# Patient Record
Sex: MALE | Race: WHITE | NOT HISPANIC OR LATINO | Employment: UNEMPLOYED | URBAN - METROPOLITAN AREA
[De-identification: names, ages, dates, MRNs, and addresses within clinical notes are randomized per-mention and may not be internally consistent; named-entity substitution may affect disease eponyms.]

---

## 2022-01-31 ENCOUNTER — TELEPHONE (OUTPATIENT)
Dept: HEMATOLOGY/ONCOLOGY | Facility: CLINIC | Age: 82
End: 2022-01-31
Payer: MEDICARE

## 2022-01-31 NOTE — TELEPHONE ENCOUNTER
"Received text on my personal phone from Dr. YUNIER Pete asking to arrange for pt to see Dr. Bales in the next couple of weeks for diagnosis of possible colon cancer. Dr. Pete states that this is his nurse's father in law and that he just had a scope performed this am and it "looks" like cancer. He also stated that the patient does not want surgery.  Dr. Pete faxed lab report and stated he will fax pathology report once it is ready. He asked me to contact Belkys Tipton at [phone] to set up appointment with DR. Bales in 2-3 weeks.   I have spoken to Belkys as requested and together we set up an appt with dr Bales for 2/22/22 at the [facility] for 1120am. She stated pt insurance is medicare. He will bring insurance card with him to appt.   Travel directions and address reviewed with her and she stated understanding. She was told that one person could be in exam room with pt and that the other person could listen in on speaker phone if they so chose. I will follow up on pathology report from 1/31/22 scope and scan to media once received. Other records scanned to media today.   She has my direct phone number and will call with any further concerns meanwhile.   "

## 2022-02-17 ENCOUNTER — TELEPHONE (OUTPATIENT)
Dept: HEMATOLOGY/ONCOLOGY | Facility: CLINIC | Age: 82
End: 2022-02-17
Payer: MEDICARE

## 2022-02-18 ENCOUNTER — TELEPHONE (OUTPATIENT)
Dept: HEMATOLOGY/ONCOLOGY | Facility: CLINIC | Age: 82
End: 2022-02-18
Payer: MEDICARE

## 2022-02-18 NOTE — TELEPHONE ENCOUNTER
Lauren (pt family member) called in today to verify appt details. We reviewed 2/22/22 appt details and travel instructions. She will have pt attend.

## 2022-02-22 ENCOUNTER — LAB VISIT (OUTPATIENT)
Dept: LAB | Facility: HOSPITAL | Age: 82
End: 2022-02-22
Attending: INTERNAL MEDICINE
Payer: MEDICARE

## 2022-02-22 ENCOUNTER — PATIENT MESSAGE (OUTPATIENT)
Dept: HEMATOLOGY/ONCOLOGY | Facility: CLINIC | Age: 82
End: 2022-02-22

## 2022-02-22 ENCOUNTER — OFFICE VISIT (OUTPATIENT)
Dept: HEMATOLOGY/ONCOLOGY | Facility: CLINIC | Age: 82
End: 2022-02-22
Payer: MEDICARE

## 2022-02-22 VITALS
HEIGHT: 78 IN | SYSTOLIC BLOOD PRESSURE: 131 MMHG | OXYGEN SATURATION: 97 % | HEART RATE: 111 BPM | TEMPERATURE: 98 F | DIASTOLIC BLOOD PRESSURE: 75 MMHG

## 2022-02-22 DIAGNOSIS — D50.0 IRON DEFICIENCY ANEMIA DUE TO CHRONIC BLOOD LOSS: ICD-10-CM

## 2022-02-22 DIAGNOSIS — R94.5 ABNORMAL RESULTS OF LIVER FUNCTION STUDIES: ICD-10-CM

## 2022-02-22 DIAGNOSIS — R93.7 ABNORMAL FINDINGS ON DIAGNOSTIC IMAGING OF OTHER PARTS OF MUSCULOSKELETAL SYSTEM: ICD-10-CM

## 2022-02-22 DIAGNOSIS — C18.2 MALIGNANT NEOPLASM OF ASCENDING COLON: Primary | ICD-10-CM

## 2022-02-22 DIAGNOSIS — C18.2 MALIGNANT NEOPLASM OF ASCENDING COLON: ICD-10-CM

## 2022-02-22 DIAGNOSIS — R94.6 ABNORMAL RESULTS OF THYROID FUNCTION STUDIES: ICD-10-CM

## 2022-02-22 DIAGNOSIS — D51.0 VITAMIN B12 DEFICIENCY ANEMIA DUE TO INTRINSIC FACTOR DEFICIENCY: ICD-10-CM

## 2022-02-22 LAB
ALBUMIN SERPL BCP-MCNC: 2.9 G/DL (ref 3.5–5.2)
ALP SERPL-CCNC: 76 U/L (ref 55–135)
ALT SERPL W/O P-5'-P-CCNC: 9 U/L (ref 10–44)
ANION GAP SERPL CALC-SCNC: 12 MMOL/L (ref 8–16)
AST SERPL-CCNC: 15 U/L (ref 10–40)
BASOPHILS # BLD AUTO: 0.09 K/UL (ref 0–0.2)
BASOPHILS NFR BLD: 0.8 % (ref 0–1.9)
BILIRUB SERPL-MCNC: 0.4 MG/DL (ref 0.1–1)
BUN SERPL-MCNC: 16 MG/DL (ref 8–23)
CALCIUM SERPL-MCNC: 8.9 MG/DL (ref 8.7–10.5)
CEA SERPL-MCNC: 6.8 NG/ML (ref 0–5)
CHLORIDE SERPL-SCNC: 100 MMOL/L (ref 95–110)
CO2 SERPL-SCNC: 25 MMOL/L (ref 23–29)
COMPLEXED PSA SERPL-MCNC: 6.6 NG/ML (ref 0–4)
CREAT SERPL-MCNC: 0.6 MG/DL (ref 0.5–1.4)
DIFFERENTIAL METHOD: ABNORMAL
EOSINOPHIL # BLD AUTO: 0.2 K/UL (ref 0–0.5)
EOSINOPHIL NFR BLD: 1.3 % (ref 0–8)
ERYTHROCYTE [DISTWIDTH] IN BLOOD BY AUTOMATED COUNT: 14.8 % (ref 11.5–14.5)
EST. GFR  (AFRICAN AMERICAN): >60 ML/MIN/1.73 M^2
EST. GFR  (NON AFRICAN AMERICAN): >60 ML/MIN/1.73 M^2
FERRITIN SERPL-MCNC: 334 NG/ML (ref 20–300)
GLUCOSE SERPL-MCNC: 101 MG/DL (ref 70–110)
HCT VFR BLD AUTO: 38.1 % (ref 40–54)
HGB BLD-MCNC: 11.9 G/DL (ref 14–18)
IMM GRANULOCYTES # BLD AUTO: 0.1 K/UL (ref 0–0.04)
IMM GRANULOCYTES NFR BLD AUTO: 0.9 % (ref 0–0.5)
IRON SERPL-MCNC: 17 UG/DL (ref 45–160)
LDH SERPL L TO P-CCNC: 166 U/L (ref 110–260)
LYMPHOCYTES # BLD AUTO: 1 K/UL (ref 1–4.8)
LYMPHOCYTES NFR BLD: 8.5 % (ref 18–48)
MCH RBC QN AUTO: 28.2 PG (ref 27–31)
MCHC RBC AUTO-ENTMCNC: 31.2 G/DL (ref 32–36)
MCV RBC AUTO: 90 FL (ref 82–98)
MONOCYTES # BLD AUTO: 1.1 K/UL (ref 0.3–1)
MONOCYTES NFR BLD: 9 % (ref 4–15)
NEUTROPHILS # BLD AUTO: 9.3 K/UL (ref 1.8–7.7)
NEUTROPHILS NFR BLD: 79.5 % (ref 38–73)
NRBC BLD-RTO: 0 /100 WBC
PLATELET # BLD AUTO: 489 K/UL (ref 150–450)
PMV BLD AUTO: 10.9 FL (ref 9.2–12.9)
POTASSIUM SERPL-SCNC: 4 MMOL/L (ref 3.5–5.1)
PROT SERPL-MCNC: 6.8 G/DL (ref 6–8.4)
RBC # BLD AUTO: 4.22 M/UL (ref 4.6–6.2)
SATURATED IRON: 8 % (ref 20–50)
SODIUM SERPL-SCNC: 137 MMOL/L (ref 136–145)
TOTAL IRON BINDING CAPACITY: 203 UG/DL (ref 250–450)
TRANSFERRIN SERPL-MCNC: 137 MG/DL (ref 200–375)
TSH SERPL DL<=0.005 MIU/L-ACNC: 2.22 UIU/ML (ref 0.4–4)
VIT B12 SERPL-MCNC: 992 PG/ML (ref 210–950)
WBC # BLD AUTO: 11.67 K/UL (ref 3.9–12.7)

## 2022-02-22 PROCEDURE — 84153 ASSAY OF PSA TOTAL: CPT | Performed by: INTERNAL MEDICINE

## 2022-02-22 PROCEDURE — 36415 COLL VENOUS BLD VENIPUNCTURE: CPT | Performed by: INTERNAL MEDICINE

## 2022-02-22 PROCEDURE — 99999 PR PBB SHADOW E&M-EST. PATIENT-LVL IV: ICD-10-PCS | Mod: PBBFAC,,, | Performed by: INTERNAL MEDICINE

## 2022-02-22 PROCEDURE — 99205 PR OFFICE/OUTPT VISIT, NEW, LEVL V, 60-74 MIN: ICD-10-PCS | Mod: S$PBB,,, | Performed by: INTERNAL MEDICINE

## 2022-02-22 PROCEDURE — 85025 COMPLETE CBC W/AUTO DIFF WBC: CPT | Performed by: INTERNAL MEDICINE

## 2022-02-22 PROCEDURE — 83615 LACTATE (LD) (LDH) ENZYME: CPT | Performed by: INTERNAL MEDICINE

## 2022-02-22 PROCEDURE — 80053 COMPREHEN METABOLIC PANEL: CPT | Performed by: INTERNAL MEDICINE

## 2022-02-22 PROCEDURE — 87389 HIV-1 AG W/HIV-1&-2 AB AG IA: CPT | Performed by: INTERNAL MEDICINE

## 2022-02-22 PROCEDURE — 80074 ACUTE HEPATITIS PANEL: CPT | Performed by: INTERNAL MEDICINE

## 2022-02-22 PROCEDURE — 99205 OFFICE O/P NEW HI 60 MIN: CPT | Mod: S$PBB,,, | Performed by: INTERNAL MEDICINE

## 2022-02-22 PROCEDURE — 99999 PR PBB SHADOW E&M-EST. PATIENT-LVL IV: CPT | Mod: PBBFAC,,, | Performed by: INTERNAL MEDICINE

## 2022-02-22 PROCEDURE — 82378 CARCINOEMBRYONIC ANTIGEN: CPT | Performed by: INTERNAL MEDICINE

## 2022-02-22 PROCEDURE — 82607 VITAMIN B-12: CPT | Performed by: INTERNAL MEDICINE

## 2022-02-22 PROCEDURE — 82728 ASSAY OF FERRITIN: CPT | Performed by: INTERNAL MEDICINE

## 2022-02-22 PROCEDURE — 84466 ASSAY OF TRANSFERRIN: CPT | Performed by: INTERNAL MEDICINE

## 2022-02-22 PROCEDURE — 84443 ASSAY THYROID STIM HORMONE: CPT | Performed by: INTERNAL MEDICINE

## 2022-02-22 PROCEDURE — 99214 OFFICE O/P EST MOD 30 MIN: CPT | Mod: PBBFAC | Performed by: INTERNAL MEDICINE

## 2022-02-22 RX ORDER — OXYCODONE AND ACETAMINOPHEN 10; 325 MG/1; MG/1
10 TABLET ORAL
COMMUNITY

## 2022-02-22 NOTE — PROGRESS NOTES
Subjective:       Patient ID: Jag Tipton is a 81 y.o. male.    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  Results in media section patient has had CT abdomen pelvis approximately 1 month ago which reveals no evidence gross metastatic disease.  Patient is presents with family with increasing fatigue weakness abdominal discomfort.  For possible consideration potential primary surgical resection ECOG status 3 patient's surgeon contacted me Dr. Sorin Pete in spoke with me concerning the patient    Past Medical History:   Diagnosis Date    Colon cancer 02/2022    cecal     Family History   Problem Relation Age of Onset    Cancer Sister     Cancer Brother      Social History     Socioeconomic History    Marital status: Legally    Tobacco Use    Smoking status: Current Every Day Smoker     Packs/day: 1.00     Years: 65.00     Pack years: 65.00     Types: Cigarettes    Smokeless tobacco: Never Used   Substance and Sexual Activity    Alcohol use: Not Currently    Drug use: Never    Sexual activity: Not Currently     Partners: Female     History reviewed. No pertinent surgical history.    Labs:  No results found for: WBC, HGB, HCT, MCV, PLT  BMP  No results found for: NA, K, CL, CO2, BUN, CREATININE, CALCIUM, ANIONGAP, ESTGFRAFRICA, EGFRNONAA  No results found for: ALT, AST, GGT, ALKPHOS, BILITOT    No results found for: IRON, TIBC, FERRITIN, SATURATEDIRO  No results found for: WEIGAEEM94  No results found for: FOLATE  No results found for: TSH      Review of Systems   Constitutional: Positive for activity change, appetite change, fatigue and unexpected weight change. Negative for chills, diaphoresis and fever.   HENT: Negative for congestion, dental problem, drooling, ear discharge, ear pain, facial swelling, hearing loss, mouth sores, nosebleeds, postnasal drip, rhinorrhea, sinus pressure, sneezing, sore throat, tinnitus, trouble  swallowing and voice change.    Eyes: Negative for photophobia, pain, discharge, redness, itching and visual disturbance.   Respiratory: Negative for apnea, cough, choking, chest tightness, shortness of breath, wheezing and stridor.    Cardiovascular: Negative for chest pain, palpitations and leg swelling.   Gastrointestinal: Positive for abdominal pain. Negative for abdominal distention, anal bleeding, blood in stool, constipation, diarrhea, nausea, rectal pain and vomiting.   Endocrine: Negative for cold intolerance, heat intolerance, polydipsia, polyphagia and polyuria.   Genitourinary: Negative for decreased urine volume, difficulty urinating, dysuria, enuresis, flank pain, frequency, genital sores, hematuria, penile discharge, penile pain, penile swelling, scrotal swelling, testicular pain and urgency.   Musculoskeletal: Positive for gait problem. Negative for arthralgias, back pain, joint swelling, myalgias, neck pain and neck stiffness.   Skin: Negative for color change, pallor, rash and wound.   Allergic/Immunologic: Negative for environmental allergies, food allergies and immunocompromised state.   Neurological: Positive for weakness. Negative for dizziness, tremors, seizures, syncope, facial asymmetry, speech difficulty, light-headedness, numbness and headaches.   Hematological: Negative for adenopathy. Does not bruise/bleed easily.   Psychiatric/Behavioral: Positive for dysphoric mood. Negative for agitation, behavioral problems, confusion, decreased concentration, hallucinations, self-injury, sleep disturbance and suicidal ideas. The patient is nervous/anxious. The patient is not hyperactive.        Objective:      Physical Exam  Vitals reviewed.   Constitutional:       General: He is in acute distress.      Appearance: He is well-developed. He is cachectic. He is ill-appearing. He is not diaphoretic.   HENT:      Head: Normocephalic.      Right Ear: External ear normal.      Left Ear: External ear  normal.      Nose: Nose normal.      Right Sinus: No maxillary sinus tenderness or frontal sinus tenderness.      Left Sinus: No maxillary sinus tenderness or frontal sinus tenderness.      Mouth/Throat:      Pharynx: No oropharyngeal exudate.   Eyes:      General: Lids are normal. No scleral icterus.        Right eye: No discharge.         Left eye: No discharge.      Extraocular Movements:      Right eye: Normal extraocular motion.      Left eye: Normal extraocular motion.      Conjunctiva/sclera:      Right eye: Right conjunctiva is not injected. No hemorrhage.     Left eye: Left conjunctiva is not injected. No hemorrhage.     Pupils: Pupils are equal, round, and reactive to light.   Neck:      Thyroid: No thyromegaly.      Vascular: No JVD.      Trachea: No tracheal deviation.   Cardiovascular:      Rate and Rhythm: Normal rate and regular rhythm.      Heart sounds: Normal heart sounds.   Pulmonary:      Effort: Pulmonary effort is normal. No respiratory distress.      Breath sounds: Normal breath sounds. No stridor.   Chest:   Breasts:      Right: No supraclavicular adenopathy.      Left: No supraclavicular adenopathy.       Abdominal:      General: Bowel sounds are normal.      Palpations: Abdomen is soft. There is no hepatomegaly, splenomegaly or mass.      Tenderness: There is no abdominal tenderness.   Musculoskeletal:         General: No tenderness. Normal range of motion.      Cervical back: Normal range of motion and neck supple.   Lymphadenopathy:      Head:      Right side of head: No posterior auricular or occipital adenopathy.      Left side of head: No posterior auricular or occipital adenopathy.      Cervical: No cervical adenopathy.      Right cervical: No superficial, deep or posterior cervical adenopathy.     Left cervical: No superficial, deep or posterior cervical adenopathy.      Upper Body:      Right upper body: No supraclavicular adenopathy.      Left upper body: No supraclavicular  adenopathy.   Skin:     General: Skin is dry.      Findings: No erythema or rash.      Nails: There is no clubbing.   Neurological:      Mental Status: He is alert and oriented to person, place, and time.      Cranial Nerves: No cranial nerve deficit.      Motor: Weakness present.      Coordination: Coordination abnormal.      Gait: Gait abnormal.   Psychiatric:         Behavior: Behavior normal.         Thought Content: Thought content normal.         Judgment: Judgment normal.             Assessment:      1. Malignant neoplasm of ascending colon    2. Iron deficiency anemia due to chronic blood loss    3. Abnormal results of thyroid function studies     4. Vitamin B12 deficiency anemia due to intrinsic factor deficiency     5. Abnormal results of liver function studies            Plan:     Extremely cachectic man history of primary colon resection high likelihood of metastatic disease.  But not seen on CT abdomen pelvis on report will have laboratory studies performed today communicate through portal recommend CT chest abdomen pelvis and see patient back immediately afterwards patient would be scheduled be seen by colorectal surgery no evidence of metastatic disease for possible primary surgical resection assuming patient is medically stable and no evidence of widespread discussed with patient if no further therapy is recommended I believe survival is less than 6 months.  Family present        Gerry Bales Jr, MD FACP

## 2022-02-23 ENCOUNTER — TELEPHONE (OUTPATIENT)
Dept: HEMATOLOGY/ONCOLOGY | Facility: CLINIC | Age: 82
End: 2022-02-23
Payer: MEDICARE

## 2022-02-23 ENCOUNTER — PATIENT MESSAGE (OUTPATIENT)
Dept: HEMATOLOGY/ONCOLOGY | Facility: CLINIC | Age: 82
End: 2022-02-23
Payer: MEDICARE

## 2022-02-23 NOTE — TELEPHONE ENCOUNTER
SW intern phoned pt to discuss their distress score of 5. Pt's daughter in law answered and explained that pt has a good support system and denied needing services at this time. SW intern will continue to monitor pt's distress score and remain available to assist with pt's needs.

## 2022-02-23 NOTE — TELEPHONE ENCOUNTER
Moved your CEA as well as the PSA or relatively low.  Your very iron deficient because you bleeding from the intestines.  If you were to decide to proceed with surgery you would need to have intravenous iron given prior to any surgery to increase her hemoglobin

## 2022-02-24 ENCOUNTER — TELEPHONE (OUTPATIENT)
Dept: SURGERY | Facility: CLINIC | Age: 82
End: 2022-02-24
Payer: MEDICARE

## 2022-02-24 NOTE — TELEPHONE ENCOUNTER
"Spoke to pt's son, Jag Jr Saeed - Advised of pt's scheduled appt with Dr Castro for Monday 3/7 [facility] @ 1500 - Son correctly repeated back all appt information - Son also asked if it's ok to see Dr Castro prior to pt's CT scheduled for 3/22 - Advised son our protocol is to have any Cancer pt's being referred to Dr Castro  scheduled within 2 weeks of referral date - Advised moving pt's appt to after 3/22 is much to far outside of our timeframe for cancer pt's -Advised, Dr Castro may want test performed that pt can get taken care of between 3/7 and 3/22 when his CT is scheduled for - Son verbalized understanding and stated "I will make sure my dad gets there"  "

## 2022-02-28 LAB
HAV IGM SERPL QL IA: NEGATIVE
HBV CORE IGM SERPL QL IA: NEGATIVE
HBV SURFACE AG SERPL QL IA: NEGATIVE
HCV AB SERPL QL IA: NEGATIVE
HIV 1+2 AB+HIV1 P24 AG SERPL QL IA: NEGATIVE

## 2022-03-07 ENCOUNTER — TELEPHONE (OUTPATIENT)
Dept: SURGERY | Facility: CLINIC | Age: 82
End: 2022-03-07
Payer: MEDICARE

## 2022-03-07 ENCOUNTER — OFFICE VISIT (OUTPATIENT)
Dept: SURGERY | Facility: CLINIC | Age: 82
End: 2022-03-07
Payer: MEDICARE

## 2022-03-07 VITALS — BODY MASS INDEX: 16.38 KG/M2 | WEIGHT: 141.75 LBS | TEMPERATURE: 99 F

## 2022-03-07 DIAGNOSIS — C18.2 MALIGNANT NEOPLASM OF ASCENDING COLON: Primary | ICD-10-CM

## 2022-03-07 DIAGNOSIS — Z01.818 PRE-OP TESTING: Primary | ICD-10-CM

## 2022-03-07 PROCEDURE — 99214 OFFICE O/P EST MOD 30 MIN: CPT | Mod: PBBFAC | Performed by: COLON & RECTAL SURGERY

## 2022-03-07 PROCEDURE — 99999 PR PBB SHADOW E&M-EST. PATIENT-LVL IV: CPT | Mod: PBBFAC,,, | Performed by: COLON & RECTAL SURGERY

## 2022-03-07 PROCEDURE — 99205 OFFICE O/P NEW HI 60 MIN: CPT | Mod: GW,S$PBB,, | Performed by: COLON & RECTAL SURGERY

## 2022-03-07 PROCEDURE — 99205 PR OFFICE/OUTPT VISIT, NEW, LEVL V, 60-74 MIN: ICD-10-PCS | Mod: GW,S$PBB,, | Performed by: COLON & RECTAL SURGERY

## 2022-03-07 PROCEDURE — 99999 PR PBB SHADOW E&M-EST. PATIENT-LVL IV: ICD-10-PCS | Mod: PBBFAC,,, | Performed by: COLON & RECTAL SURGERY

## 2022-03-07 RX ORDER — SODIUM CHLORIDE, SODIUM LACTATE, POTASSIUM CHLORIDE, CALCIUM CHLORIDE 600; 310; 30; 20 MG/100ML; MG/100ML; MG/100ML; MG/100ML
INJECTION, SOLUTION INTRAVENOUS CONTINUOUS
Status: CANCELLED | OUTPATIENT
Start: 2022-03-07

## 2022-03-07 RX ORDER — METRONIDAZOLE 500 MG/100ML
500 INJECTION, SOLUTION INTRAVENOUS
Status: CANCELLED | OUTPATIENT
Start: 2022-03-07

## 2022-03-07 RX ORDER — ACETAMINOPHEN 325 MG/1
1000 TABLET ORAL ONCE
Status: CANCELLED | OUTPATIENT
Start: 2022-03-07 | End: 2022-03-07

## 2022-03-07 RX ORDER — NEOMYCIN SULFATE 500 MG/1
1000 TABLET ORAL 3 TIMES DAILY
Qty: 6 TABLET | Refills: 0 | Status: SHIPPED | OUTPATIENT
Start: 2022-03-07

## 2022-03-07 RX ORDER — POLYETHYLENE GLYCOL 3350 17 G/17G
238 POWDER, FOR SOLUTION ORAL DAILY
Qty: 1 EACH | Refills: 0 | Status: SHIPPED | OUTPATIENT
Start: 2022-03-07

## 2022-03-07 RX ORDER — ONDANSETRON 2 MG/ML
4 INJECTION INTRAMUSCULAR; INTRAVENOUS EVERY 12 HOURS PRN
Status: CANCELLED | OUTPATIENT
Start: 2022-03-07

## 2022-03-07 RX ORDER — CELECOXIB 100 MG/1
400 CAPSULE ORAL
Status: CANCELLED | OUTPATIENT
Start: 2022-03-07 | End: 2022-03-07

## 2022-03-07 RX ORDER — HEPARIN SODIUM 5000 [USP'U]/ML
5000 INJECTION, SOLUTION INTRAVENOUS; SUBCUTANEOUS
Status: CANCELLED | OUTPATIENT
Start: 2022-03-07 | End: 2022-03-08

## 2022-03-07 RX ORDER — METRONIDAZOLE 500 MG/1
500 TABLET ORAL 3 TIMES DAILY
Qty: 3 TABLET | Refills: 0 | Status: SHIPPED | OUTPATIENT
Start: 2022-03-07

## 2022-03-07 RX ORDER — GABAPENTIN 100 MG/1
800 CAPSULE ORAL
Status: CANCELLED | OUTPATIENT
Start: 2022-03-07 | End: 2022-03-07

## 2022-03-07 NOTE — TELEPHONE ENCOUNTER
Spoke to Nikki with [facility] - Asked that we call before ordering any testing for pt and or scheduling Sx - To help ensure pt does not get dropped from Hospice - Advised Nikki I would call if any test or Sx are needed [phone]      ----- Message from Harley Toth sent at 3/7/2022 10:00 AM CST -----  Contact: [facility]  Warner Christopher from [facility] Needs to speak with the office to let the PT know he is a hospice patient. Call back at [phone].

## 2022-03-07 NOTE — PROGRESS NOTES
History & Physical    SUBJECTIVE:     CC: Colon cancer  Ref: Castro Bales MD    History of Present Illness:  Patient is a 81 y.o. 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  Patient has developed abdominal pain and a 40 lb weight loss over the past 6 months prompting a workup.  CT scan of the abdomen and pelvis revealed a large cecal mass and evidence of metastatic disease.  Patient did undergo colonoscopy where a large mass was seen in the right colon confirming adenocarcinoma.  Patient has had associated decreased appetite.  Denies any hematochezia or melena.  Does suffer from intermittent fecal incontinence.  Has seen Hematology-Oncology and referred to Colorectal surgery for primary resection.  He denies any family history of colorectal cancer.  Only past surgical history is right inguinal hernia repair    Review of patient's allergies indicates:  No Known Allergies    Current Outpatient Medications   Medication Sig Dispense Refill    oxyCODONE-acetaminophen (PERCOCET)  mg per tablet Take 10 tablets by mouth as needed.      fentaNYL (DURAGESIC) 100 mcg/hr Place 1 patch onto the skin every 72 hours.      furosemide (LASIX) 20 MG tablet Take 20 mg by mouth 2 (two) times daily.      ibuprofen (ADVIL,MOTRIN) 600 MG tablet Take 600 mg by mouth every 6 (six) hours as needed for Pain.      metroNIDAZOLE (FLAGYL) 500 MG tablet Take 1 tablet (500 mg total) by mouth 3 (three) times daily. Take initial dose@2pm, second dose@3pm and final dose@10pm day before surgery 3 tablet 0    neomycin (MYCIFRADIN) 500 mg Tab Take 2 tablets (1,000 mg total) by mouth 3 (three) times daily. Take 1st dose@2pm,take 2nd dose@3pm, take last dose@10pm day before surgery 6 tablet 0    polyethylene glycol (GLYCOLAX) 17 gram/dose powder Take 238 g by mouth once daily. Mix with 2L of gatorade, drink all mixed solution starting@12pm day before surgery, finish by 10pm 1 each 0    traZODone (DESYREL) 50 MG tablet Take 50 mg by  mouth every evening.       No current facility-administered medications for this visit.       Past Medical History:   Diagnosis Date    Colon cancer 02/2022    cecal     No past surgical history on file.  Family History   Problem Relation Age of Onset    Cancer Sister     Cancer Brother      Social History     Tobacco Use    Smoking status: Current Every Day Smoker     Packs/day: 1.00     Years: 65.00     Pack years: 65.00     Types: Cigarettes    Smokeless tobacco: Never Used   Substance Use Topics    Alcohol use: Not Currently    Drug use: Never        Review of Systems:  Review of Systems   Constitutional: Positive for activity change, appetite change and unexpected weight change. Negative for chills, fatigue and fever.   HENT: Negative for congestion, ear pain, sore throat and trouble swallowing.    Eyes: Negative for pain, redness and itching.   Respiratory: Negative for cough, shortness of breath and wheezing.    Cardiovascular: Negative for chest pain, palpitations and leg swelling.   Gastrointestinal: Positive for abdominal pain. Negative for abdominal distention, anal bleeding, blood in stool, constipation, diarrhea, nausea, rectal pain and vomiting.   Endocrine: Negative for cold intolerance, heat intolerance and polyuria.   Genitourinary: Negative for dysuria, flank pain, frequency and hematuria.   Musculoskeletal: Negative for gait problem, joint swelling and neck pain.   Skin: Negative for color change, rash and wound.   Allergic/Immunologic: Negative for environmental allergies and immunocompromised state.   Neurological: Negative for dizziness, speech difficulty, weakness and numbness.   Psychiatric/Behavioral: Negative for agitation, confusion and hallucinations.       OBJECTIVE:     Vital Signs (Most Recent)  Temp: 98.6 °F (37 °C) (03/07/22 1425)     64.3 kg (141 lb 12.1 oz)     Physical Exam:  Physical Exam  Constitutional:       Appearance: He is well-developed.      Comments: Cachectic    HENT:      Head: Normocephalic and atraumatic.   Eyes:      Conjunctiva/sclera: Conjunctivae normal.   Neck:      Thyroid: No thyromegaly.   Cardiovascular:      Rate and Rhythm: Normal rate and regular rhythm.   Pulmonary:      Effort: Pulmonary effort is normal. No respiratory distress.   Abdominal:      General: There is no distension.      Palpations: Abdomen is soft. There is mass (RLQ).      Tenderness: There is no abdominal tenderness.   Musculoskeletal:         General: No tenderness. Normal range of motion.      Cervical back: Normal range of motion.   Skin:     General: Skin is warm and dry.      Capillary Refill: Capillary refill takes less than 2 seconds.      Findings: No rash.   Neurological:      Mental Status: He is alert and oriented to person, place, and time.         Laboratory  Lab Results   Component Value Date    WBC 11.67 02/22/2022    HGB 11.9 (L) 02/22/2022    HCT 38.1 (L) 02/22/2022     (H) 02/22/2022    ALT 9 (L) 02/22/2022    AST 15 02/22/2022     02/22/2022    K 4.0 02/22/2022     02/22/2022    CREATININE 0.6 02/22/2022    BUN 16 02/22/2022    CO2 25 02/22/2022    TSH 2.216 02/22/2022     CEA: 6.8 (Feb '22)      Diagnostic Results:  CT: Reviewed    Colonoscopy images:      PATH: reviewed OSH reports    ASSESSMENT/PLAN:     80yo M with R colon adenocarcinoma with severe weight loss and fecal incontinence    - long discussion with the patient and his family regarding his diagnosis of adenocarcinoma.  Discussed that with the lack of metastatic disease, the severe symptoms he is experiencing, I would recommend upfront resection for him.  We discussed that with ongoing fecal incontinence, severe weight loss and malnutrition, end ileostomy should be considered and he is comfortable with this and would like to proceed with an ileostomy.  - plan for open right hemicolectomy with end ileostomy on 03/15/2022  - All risks, benefits and alternatives fully explained to patient.  Risks include, but are not limited to, bleeding, infection, anastomotic leak, damage to ureter, damage to other intra-abdominal organs such as colon, rectum, small bowel, stomach, liver, bladder, reproductive organs, sexual dysfunction, urinary dysfunction, postoperative abscess, perioperative MI, CVA and death.  All questions field and appropriately answered to patient's satisfaction.  Consent signed and placed on chart.  - mechanical and oral antibiotic bowel prep  - ERAS protocol  - WCON c/s and marking preop day of surgery  - RTC postop    Michael Castro MD  Colon and Rectal Surgery  [facility]

## 2022-03-07 NOTE — H&P (VIEW-ONLY)
History & Physical    SUBJECTIVE:     CC: Colon cancer  Ref: Castro Bales MD    History of Present Illness:  Patient is a 81 y.o. 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  Patient has developed abdominal pain and a 40 lb weight loss over the past 6 months prompting a workup.  CT scan of the abdomen and pelvis revealed a large cecal mass and evidence of metastatic disease.  Patient did undergo colonoscopy where a large mass was seen in the right colon confirming adenocarcinoma.  Patient has had associated decreased appetite.  Denies any hematochezia or melena.  Does suffer from intermittent fecal incontinence.  Has seen Hematology-Oncology and referred to Colorectal surgery for primary resection.  He denies any family history of colorectal cancer.  Only past surgical history is right inguinal hernia repair    Review of patient's allergies indicates:  No Known Allergies    Current Outpatient Medications   Medication Sig Dispense Refill    oxyCODONE-acetaminophen (PERCOCET)  mg per tablet Take 10 tablets by mouth as needed.      fentaNYL (DURAGESIC) 100 mcg/hr Place 1 patch onto the skin every 72 hours.      furosemide (LASIX) 20 MG tablet Take 20 mg by mouth 2 (two) times daily.      ibuprofen (ADVIL,MOTRIN) 600 MG tablet Take 600 mg by mouth every 6 (six) hours as needed for Pain.      metroNIDAZOLE (FLAGYL) 500 MG tablet Take 1 tablet (500 mg total) by mouth 3 (three) times daily. Take initial dose@2pm, second dose@3pm and final dose@10pm day before surgery 3 tablet 0    neomycin (MYCIFRADIN) 500 mg Tab Take 2 tablets (1,000 mg total) by mouth 3 (three) times daily. Take 1st dose@2pm,take 2nd dose@3pm, take last dose@10pm day before surgery 6 tablet 0    polyethylene glycol (GLYCOLAX) 17 gram/dose powder Take 238 g by mouth once daily. Mix with 2L of gatorade, drink all mixed solution starting@12pm day before surgery, finish by 10pm 1 each 0    traZODone (DESYREL) 50 MG tablet Take 50 mg by  mouth every evening.       No current facility-administered medications for this visit.       Past Medical History:   Diagnosis Date    Colon cancer 02/2022    cecal     No past surgical history on file.  Family History   Problem Relation Age of Onset    Cancer Sister     Cancer Brother      Social History     Tobacco Use    Smoking status: Current Every Day Smoker     Packs/day: 1.00     Years: 65.00     Pack years: 65.00     Types: Cigarettes    Smokeless tobacco: Never Used   Substance Use Topics    Alcohol use: Not Currently    Drug use: Never        Review of Systems:  Review of Systems   Constitutional: Positive for activity change, appetite change and unexpected weight change. Negative for chills, fatigue and fever.   HENT: Negative for congestion, ear pain, sore throat and trouble swallowing.    Eyes: Negative for pain, redness and itching.   Respiratory: Negative for cough, shortness of breath and wheezing.    Cardiovascular: Negative for chest pain, palpitations and leg swelling.   Gastrointestinal: Positive for abdominal pain. Negative for abdominal distention, anal bleeding, blood in stool, constipation, diarrhea, nausea, rectal pain and vomiting.   Endocrine: Negative for cold intolerance, heat intolerance and polyuria.   Genitourinary: Negative for dysuria, flank pain, frequency and hematuria.   Musculoskeletal: Negative for gait problem, joint swelling and neck pain.   Skin: Negative for color change, rash and wound.   Allergic/Immunologic: Negative for environmental allergies and immunocompromised state.   Neurological: Negative for dizziness, speech difficulty, weakness and numbness.   Psychiatric/Behavioral: Negative for agitation, confusion and hallucinations.       OBJECTIVE:     Vital Signs (Most Recent)  Temp: 98.6 °F (37 °C) (03/07/22 1425)     64.3 kg (141 lb 12.1 oz)     Physical Exam:  Physical Exam  Constitutional:       Appearance: He is well-developed.      Comments: Cachectic    HENT:      Head: Normocephalic and atraumatic.   Eyes:      Conjunctiva/sclera: Conjunctivae normal.   Neck:      Thyroid: No thyromegaly.   Cardiovascular:      Rate and Rhythm: Normal rate and regular rhythm.   Pulmonary:      Effort: Pulmonary effort is normal. No respiratory distress.   Abdominal:      General: There is no distension.      Palpations: Abdomen is soft. There is mass (RLQ).      Tenderness: There is no abdominal tenderness.   Musculoskeletal:         General: No tenderness. Normal range of motion.      Cervical back: Normal range of motion.   Skin:     General: Skin is warm and dry.      Capillary Refill: Capillary refill takes less than 2 seconds.      Findings: No rash.   Neurological:      Mental Status: He is alert and oriented to person, place, and time.         Laboratory  Lab Results   Component Value Date    WBC 11.67 02/22/2022    HGB 11.9 (L) 02/22/2022    HCT 38.1 (L) 02/22/2022     (H) 02/22/2022    ALT 9 (L) 02/22/2022    AST 15 02/22/2022     02/22/2022    K 4.0 02/22/2022     02/22/2022    CREATININE 0.6 02/22/2022    BUN 16 02/22/2022    CO2 25 02/22/2022    TSH 2.216 02/22/2022     CEA: 6.8 (Feb '22)      Diagnostic Results:  CT: Reviewed    Colonoscopy images:      PATH: reviewed OSH reports    ASSESSMENT/PLAN:     82yo M with R colon adenocarcinoma with severe weight loss and fecal incontinence    - long discussion with the patient and his family regarding his diagnosis of adenocarcinoma.  Discussed that with the lack of metastatic disease, the severe symptoms he is experiencing, I would recommend upfront resection for him.  We discussed that with ongoing fecal incontinence, severe weight loss and malnutrition, end ileostomy should be considered and he is comfortable with this and would like to proceed with an ileostomy.  - plan for open right hemicolectomy with end ileostomy on 03/15/2022  - All risks, benefits and alternatives fully explained to patient.  Risks include, but are not limited to, bleeding, infection, anastomotic leak, damage to ureter, damage to other intra-abdominal organs such as colon, rectum, small bowel, stomach, liver, bladder, reproductive organs, sexual dysfunction, urinary dysfunction, postoperative abscess, perioperative MI, CVA and death.  All questions field and appropriately answered to patient's satisfaction.  Consent signed and placed on chart.  - mechanical and oral antibiotic bowel prep  - ERAS protocol  - WCON c/s and marking preop day of surgery  - RTC postop    Michael Castro MD  Colon and Rectal Surgery  [facility]

## 2022-03-10 ENCOUNTER — TELEPHONE (OUTPATIENT)
Dept: SURGERY | Facility: CLINIC | Age: 82
End: 2022-03-10
Payer: MEDICARE

## 2022-03-10 NOTE — TELEPHONE ENCOUNTER
Due to patient's inability to get around easily, will have all Pre Op testing morning of Sx on 3/15 per Dr Castro    Pre Admit notified - Documentation for reference point if needed

## 2022-03-11 RX ORDER — FENTANYL 100 UG/H
1 PATCH TRANSDERMAL
COMMUNITY

## 2022-03-11 RX ORDER — IBUPROFEN 600 MG/1
600 TABLET ORAL EVERY 6 HOURS PRN
COMMUNITY

## 2022-03-11 RX ORDER — TRAZODONE HYDROCHLORIDE 50 MG/1
50 TABLET ORAL NIGHTLY
COMMUNITY

## 2022-03-11 RX ORDER — FUROSEMIDE 20 MG/1
20 TABLET ORAL 2 TIMES DAILY
COMMUNITY

## 2022-03-11 NOTE — PRE-PROCEDURE INSTRUCTIONS
Called and spoke with Dr Neri (Anesthesia) regarding pt chronic pain, pt may wear fentanyl patch day of surgery and also take percocet morning of.

## 2022-03-11 NOTE — PRE ADMISSION SCREENING
Pre op instructions reviewed with pt daughter in law per phone: Spoke about pre op process and surgery instructions, verbalized understanding.    To confirm, Your surgeon has instructed you:  Surgery is scheduled on 3/15/22      Please report to the main hospital (1st Floor) at [facility] off of [facility] (2nd building on the left, in front of the flag pole).  Please arrive at 0830am. We will call you the afternoon prior to surgery to confirm arrival time, as it is subject to change due to cancellations & emergencies.    Your Covid Test appointment is on day of surgery!       INSTRUCTIONS IMPORTANT!!!  Do Not Drink, or Smoke after 12 midnight! NO WATER after midnight! OK to brush teeth, no gum, candy or mints!    Drink CLEAR LIQUIDS (water, broth, jello, juice) Only day before surgery! NO SOLID FOOD day before or day of surgery!    Take antibiotics day before surgery. Start bowel prep day before surgery at 12pm, finish by 10pm.       metronidazole 500 mg Oral 3 times daily, Take initial dose@2pm, second dose@3pm and final dose@10pm day before surgery     neomycin sulfate 1,000 mg Oral 3 times daily, Take 1st dose@2pm,take 2nd dose@3pm, take last dose@10pm day before surgery     polyethylene glycol 3350 238 g Oral Daily, Mix with 2L of gatorade, drink all mixed solution starting@12pm day before surgery, finish by 10pm         *Take only these medicines with a small swallow of water-morning of surgery.  Percocet  Fentanyl patch (per Dr Neri)        ____  Do Not wear makeup, mascara or nail polish.   ____  NO Acrylic/fake nails worn day of surgery (hand/arm surgeries)  ____  NO powder, lotions, deodorants or creams to surgical area.  ____  Please remove all jewelry, including piercings and leave at home.  ____  Dentures, Hearing Aids and Contact Lens will need to be removed prior to the start of surgery.  ____  Please bring photo ID and insurance information to hospital (Leave Valuables at Home)  ____  If going home  the same day, arrange for a ride home. You will not be able to             drive if Anesthesia was used.   ____  Wear clean, loose fitting clothing. Allow for dressings, bandages.  ____  Stop Aspirin, Ibuprofen, Motrin and Aleve at least 5-7 days before surgery, unless otherwise instructed by your doctor, or the nurse. You MAY use Tylenol/acetaminophen until day of                surgery.  ____  If you take diabetic medication, do NOT take morning of surgery unless instructed by             Doctor. Metformin to be stopped 24 hrs prior to surgery time. DO NOT take long-acting insulin the evening before surgery. Blood sugars will be checked in pre-op morning of.  ____ Stop taking any Fish Oil supplements or Vitamins at least 5 days prior to surgery, unless instructed otherwise by your Doctor.          Bathing Instructions: The night before surgery and the morning prior to coming to the hospital:   -Do not shave your face or body day before or day of surgery!   -Shower & Rinse your body as usual with anti-bacterial Soap (Dial, Lever 2000, or Hibiclens)   -Do not use hibiclens on your head, face, or genitals.   -Do not wash with anti-bacterial soap after you use the hibiclens.   -Rinse your body thoroughly.      [facility] Visitor/Ride Policy:  Only 1 adult allowed (over the age of 18) to accompany you into Pre-op/Recovery Surgery Dept. All other visitors will need to wait in waiting room. Must have a ride home from a responsible adult that you know and trust. Medical Transport, Uber or Lyft can only be used if patient has a responsible adult to accompany them during ride home.      Surgical Site Infection:    Prevention of surgical site infections:     -Keep incisions clean and dry.   -Do not soak/submerge incisions in water until completely healed.   -Do not apply lotions, powders, creams, or deodorants to site.   -Always make sure hands are cleaned with antibacterial soap/ alcohol-based   prior to touching the  surgical site.      Signs and symptoms:   -Redness and pain around the area where you had surgery   -Drainage of cloudy fluid from your surgical wound   -Fever over 100.4 or chills  >>>Call Surgeon office/on-call Surgeon if you experience any of these signs & symptoms post-surgery.      *Please Call [facility] Pre-Admissions Department with surgery instruction questions at [phone] or [phone].    *Insurance Questions, please call [phone].

## 2022-03-15 ENCOUNTER — ANESTHESIA EVENT (OUTPATIENT)
Dept: SURGERY | Facility: HOSPITAL | Age: 82
DRG: 329 | End: 2022-03-15
Payer: MEDICARE

## 2022-03-15 ENCOUNTER — HOSPITAL ENCOUNTER (INPATIENT)
Facility: HOSPITAL | Age: 82
LOS: 8 days | Discharge: HOSPICE | DRG: 329 | End: 2022-03-23
Attending: COLON & RECTAL SURGERY | Admitting: COLON & RECTAL SURGERY
Payer: MEDICARE

## 2022-03-15 ENCOUNTER — ANESTHESIA (OUTPATIENT)
Dept: SURGERY | Facility: HOSPITAL | Age: 82
DRG: 329 | End: 2022-03-15
Payer: MEDICARE

## 2022-03-15 DIAGNOSIS — Z01.810 PREOP CARDIOVASCULAR EXAM: ICD-10-CM

## 2022-03-15 DIAGNOSIS — C18.2 MALIGNANT NEOPLASM OF ASCENDING COLON: ICD-10-CM

## 2022-03-15 LAB
CTP QC/QA: YES
POCT GLUCOSE: 100 MG/DL (ref 70–110)
SARS-COV-2 AG RESP QL IA.RAPID: NEGATIVE

## 2022-03-15 PROCEDURE — 93010 ELECTROCARDIOGRAM REPORT: CPT | Mod: ,,, | Performed by: INTERNAL MEDICINE

## 2022-03-15 PROCEDURE — 88313 SPECIAL STAINS GROUP 2: CPT | Mod: 26,,, | Performed by: PATHOLOGY

## 2022-03-15 PROCEDURE — C1729 CATH, DRAINAGE: HCPCS | Performed by: COLON & RECTAL SURGERY

## 2022-03-15 PROCEDURE — 81210 BRAF GENE: CPT | Performed by: PATHOLOGY

## 2022-03-15 PROCEDURE — 44141 PR PART REMOVAL COLON W COLOSTOMY: ICD-10-PCS | Mod: ,,, | Performed by: COLON & RECTAL SURGERY

## 2022-03-15 PROCEDURE — 25000003 PHARM REV CODE 250: Performed by: COLON & RECTAL SURGERY

## 2022-03-15 PROCEDURE — 49999 PR BIOPSY, PERITONEUM, OPEN: ICD-10-PCS | Mod: ,,, | Performed by: COLON & RECTAL SURGERY

## 2022-03-15 PROCEDURE — 88381 MICRODISSECTION MANUAL: CPT | Performed by: PATHOLOGY

## 2022-03-15 PROCEDURE — 88307 TISSUE EXAM BY PATHOLOGIST: CPT | Mod: 59 | Performed by: PATHOLOGY

## 2022-03-15 PROCEDURE — 37000008 HC ANESTHESIA 1ST 15 MINUTES: Performed by: COLON & RECTAL SURGERY

## 2022-03-15 PROCEDURE — S0030 INJECTION, METRONIDAZOLE: HCPCS | Performed by: COLON & RECTAL SURGERY

## 2022-03-15 PROCEDURE — 63600175 PHARM REV CODE 636 W HCPCS: Performed by: COLON & RECTAL SURGERY

## 2022-03-15 PROCEDURE — 81288 MLH1 GENE: CPT | Performed by: PATHOLOGY

## 2022-03-15 PROCEDURE — 63600175 PHARM REV CODE 636 W HCPCS

## 2022-03-15 PROCEDURE — 71000039 HC RECOVERY, EACH ADD'L HOUR: Performed by: COLON & RECTAL SURGERY

## 2022-03-15 PROCEDURE — 88305 TISSUE EXAM BY PATHOLOGIST: CPT | Mod: 59 | Performed by: PATHOLOGY

## 2022-03-15 PROCEDURE — 11000001 HC ACUTE MED/SURG PRIVATE ROOM

## 2022-03-15 PROCEDURE — 88313 SPECIAL STAINS GROUP 2: CPT | Mod: 59 | Performed by: PATHOLOGY

## 2022-03-15 PROCEDURE — 88342 IMHCHEM/IMCYTCHM 1ST ANTB: CPT | Performed by: PATHOLOGY

## 2022-03-15 PROCEDURE — 93010 EKG 12-LEAD: ICD-10-PCS | Mod: ,,, | Performed by: INTERNAL MEDICINE

## 2022-03-15 PROCEDURE — 88307 PR  SURG PATH,LEVEL V: ICD-10-PCS | Mod: 26,,, | Performed by: PATHOLOGY

## 2022-03-15 PROCEDURE — 44141 PARTIAL REMOVAL OF COLON: CPT | Mod: ,,, | Performed by: COLON & RECTAL SURGERY

## 2022-03-15 PROCEDURE — 49999 UNLISTED PX ABD PERTM&OMN: CPT | Mod: ,,, | Performed by: COLON & RECTAL SURGERY

## 2022-03-15 PROCEDURE — 25000003 PHARM REV CODE 250: Performed by: NURSE ANESTHETIST, CERTIFIED REGISTERED

## 2022-03-15 PROCEDURE — 94761 N-INVAS EAR/PLS OXIMETRY MLT: CPT

## 2022-03-15 PROCEDURE — 88342 IMHCHEM/IMCYTCHM 1ST ANTB: CPT | Mod: 26,,, | Performed by: PATHOLOGY

## 2022-03-15 PROCEDURE — 47100 PR WEDGE BIOPSY OF LIVER: ICD-10-PCS | Mod: 51,,, | Performed by: COLON & RECTAL SURGERY

## 2022-03-15 PROCEDURE — 88341 IMHCHEM/IMCYTCHM EA ADD ANTB: CPT | Mod: 26,,, | Performed by: PATHOLOGY

## 2022-03-15 PROCEDURE — 37000009 HC ANESTHESIA EA ADD 15 MINS: Performed by: COLON & RECTAL SURGERY

## 2022-03-15 PROCEDURE — 36000708 HC OR TIME LEV III 1ST 15 MIN: Performed by: COLON & RECTAL SURGERY

## 2022-03-15 PROCEDURE — 88342 CHG IMMUNOCYTOCHEMISTRY: ICD-10-PCS | Mod: 26,,, | Performed by: PATHOLOGY

## 2022-03-15 PROCEDURE — 88305 TISSUE EXAM BY PATHOLOGIST: CPT | Mod: 26,,, | Performed by: PATHOLOGY

## 2022-03-15 PROCEDURE — 99900035 HC TECH TIME PER 15 MIN (STAT)

## 2022-03-15 PROCEDURE — 88341 IMHCHEM/IMCYTCHM EA ADD ANTB: CPT | Mod: 59 | Performed by: PATHOLOGY

## 2022-03-15 PROCEDURE — C9290 INJ, BUPIVACAINE LIPOSOME: HCPCS | Performed by: COLON & RECTAL SURGERY

## 2022-03-15 PROCEDURE — 27201423 OPTIME MED/SURG SUP & DEVICES STERILE SUPPLY: Performed by: COLON & RECTAL SURGERY

## 2022-03-15 PROCEDURE — 88307 TISSUE EXAM BY PATHOLOGIST: CPT | Mod: 26,,, | Performed by: PATHOLOGY

## 2022-03-15 PROCEDURE — 71000033 HC RECOVERY, INTIAL HOUR: Performed by: COLON & RECTAL SURGERY

## 2022-03-15 PROCEDURE — 27000221 HC OXYGEN, UP TO 24 HOURS

## 2022-03-15 PROCEDURE — 88341 PR IHC OR ICC EACH ADD'L SINGLE ANTIBODY  STAINPR: ICD-10-PCS | Mod: 26,,, | Performed by: PATHOLOGY

## 2022-03-15 PROCEDURE — 88305 TISSUE EXAM BY PATHOLOGIST: ICD-10-PCS | Mod: 26,,, | Performed by: PATHOLOGY

## 2022-03-15 PROCEDURE — 36000709 HC OR TIME LEV III EA ADD 15 MIN: Performed by: COLON & RECTAL SURGERY

## 2022-03-15 PROCEDURE — 63600175 PHARM REV CODE 636 W HCPCS: Performed by: NURSE ANESTHETIST, CERTIFIED REGISTERED

## 2022-03-15 PROCEDURE — 47100 WEDGE BIOPSY OF LIVER: CPT | Mod: 51,,, | Performed by: COLON & RECTAL SURGERY

## 2022-03-15 PROCEDURE — 88313 PR  SPECIAL STAINS,GROUP II: ICD-10-PCS | Mod: 26,,, | Performed by: PATHOLOGY

## 2022-03-15 PROCEDURE — 25000003 PHARM REV CODE 250

## 2022-03-15 PROCEDURE — 93005 ELECTROCARDIOGRAM TRACING: CPT

## 2022-03-15 RX ORDER — BUPIVACAINE HYDROCHLORIDE 2.5 MG/ML
INJECTION, SOLUTION EPIDURAL; INFILTRATION; INTRACAUDAL
Status: DISCONTINUED | OUTPATIENT
Start: 2022-03-15 | End: 2022-03-15 | Stop reason: HOSPADM

## 2022-03-15 RX ORDER — ACETAMINOPHEN 325 MG/1
650 TABLET ORAL EVERY 6 HOURS PRN
Status: DISCONTINUED | OUTPATIENT
Start: 2022-03-15 | End: 2022-03-15

## 2022-03-15 RX ORDER — ROCURONIUM BROMIDE 10 MG/ML
INJECTION, SOLUTION INTRAVENOUS
Status: DISCONTINUED | OUTPATIENT
Start: 2022-03-15 | End: 2022-03-15

## 2022-03-15 RX ORDER — ONDANSETRON 2 MG/ML
4 INJECTION INTRAMUSCULAR; INTRAVENOUS EVERY 12 HOURS PRN
Status: DISCONTINUED | OUTPATIENT
Start: 2022-03-15 | End: 2022-03-23 | Stop reason: HOSPADM

## 2022-03-15 RX ORDER — ONDANSETRON 2 MG/ML
4 INJECTION INTRAMUSCULAR; INTRAVENOUS DAILY PRN
Status: DISCONTINUED | OUTPATIENT
Start: 2022-03-15 | End: 2022-03-15 | Stop reason: HOSPADM

## 2022-03-15 RX ORDER — CELECOXIB 100 MG/1
400 CAPSULE ORAL
Status: COMPLETED | OUTPATIENT
Start: 2022-03-15 | End: 2022-03-15

## 2022-03-15 RX ORDER — SODIUM CHLORIDE, SODIUM LACTATE, POTASSIUM CHLORIDE, CALCIUM CHLORIDE 600; 310; 30; 20 MG/100ML; MG/100ML; MG/100ML; MG/100ML
INJECTION, SOLUTION INTRAVENOUS CONTINUOUS
Status: DISCONTINUED | OUTPATIENT
Start: 2022-03-15 | End: 2022-03-15

## 2022-03-15 RX ORDER — OXYCODONE HYDROCHLORIDE 5 MG/1
5 TABLET ORAL
Status: DISCONTINUED | OUTPATIENT
Start: 2022-03-15 | End: 2022-03-15 | Stop reason: HOSPADM

## 2022-03-15 RX ORDER — PROPOFOL 10 MG/ML
VIAL (ML) INTRAVENOUS
Status: DISCONTINUED | OUTPATIENT
Start: 2022-03-15 | End: 2022-03-15

## 2022-03-15 RX ORDER — SODIUM CHLORIDE 0.9 % (FLUSH) 0.9 %
3 SYRINGE (ML) INJECTION
Status: DISCONTINUED | OUTPATIENT
Start: 2022-03-15 | End: 2022-03-23 | Stop reason: HOSPADM

## 2022-03-15 RX ORDER — LIDOCAINE HYDROCHLORIDE 20 MG/ML
INJECTION, SOLUTION EPIDURAL; INFILTRATION; INTRACAUDAL; PERINEURAL
Status: DISCONTINUED | OUTPATIENT
Start: 2022-03-15 | End: 2022-03-15

## 2022-03-15 RX ORDER — MEPERIDINE HYDROCHLORIDE 25 MG/ML
12.5 INJECTION INTRAMUSCULAR; INTRAVENOUS; SUBCUTANEOUS ONCE
Status: DISCONTINUED | OUTPATIENT
Start: 2022-03-15 | End: 2022-03-15 | Stop reason: HOSPADM

## 2022-03-15 RX ORDER — KETOROLAC TROMETHAMINE 30 MG/ML
15 INJECTION, SOLUTION INTRAMUSCULAR; INTRAVENOUS EVERY 8 HOURS PRN
Status: DISCONTINUED | OUTPATIENT
Start: 2022-03-15 | End: 2022-03-15 | Stop reason: HOSPADM

## 2022-03-15 RX ORDER — FENTANYL CITRATE 50 UG/ML
INJECTION, SOLUTION INTRAMUSCULAR; INTRAVENOUS
Status: DISCONTINUED | OUTPATIENT
Start: 2022-03-15 | End: 2022-03-15

## 2022-03-15 RX ORDER — HYDROMORPHONE HYDROCHLORIDE 2 MG/ML
0.2 INJECTION, SOLUTION INTRAMUSCULAR; INTRAVENOUS; SUBCUTANEOUS EVERY 5 MIN PRN
Status: DISCONTINUED | OUTPATIENT
Start: 2022-03-15 | End: 2022-03-15

## 2022-03-15 RX ORDER — ACETAMINOPHEN 500 MG
1000 TABLET ORAL ONCE
Status: COMPLETED | OUTPATIENT
Start: 2022-03-15 | End: 2022-03-15

## 2022-03-15 RX ORDER — HEPARIN SODIUM 5000 [USP'U]/ML
5000 INJECTION, SOLUTION INTRAVENOUS; SUBCUTANEOUS
Status: COMPLETED | OUTPATIENT
Start: 2022-03-15 | End: 2022-03-15

## 2022-03-15 RX ORDER — ACETAMINOPHEN 10 MG/ML
1000 INJECTION, SOLUTION INTRAVENOUS EVERY 8 HOURS
Status: COMPLETED | OUTPATIENT
Start: 2022-03-15 | End: 2022-03-16

## 2022-03-15 RX ORDER — ALBUTEROL SULFATE 0.83 MG/ML
2.5 SOLUTION RESPIRATORY (INHALATION) EVERY 4 HOURS PRN
Status: DISCONTINUED | OUTPATIENT
Start: 2022-03-15 | End: 2022-03-23 | Stop reason: HOSPADM

## 2022-03-15 RX ORDER — ONDANSETRON 2 MG/ML
4 INJECTION INTRAMUSCULAR; INTRAVENOUS EVERY 12 HOURS PRN
Status: DISCONTINUED | OUTPATIENT
Start: 2022-03-15 | End: 2022-03-15 | Stop reason: HOSPADM

## 2022-03-15 RX ORDER — GABAPENTIN 400 MG/1
800 CAPSULE ORAL
Status: COMPLETED | OUTPATIENT
Start: 2022-03-15 | End: 2022-03-15

## 2022-03-15 RX ORDER — DIPHENHYDRAMINE HYDROCHLORIDE 50 MG/ML
25 INJECTION INTRAMUSCULAR; INTRAVENOUS EVERY 6 HOURS PRN
Status: DISCONTINUED | OUTPATIENT
Start: 2022-03-15 | End: 2022-03-15 | Stop reason: HOSPADM

## 2022-03-15 RX ORDER — ONDANSETRON 2 MG/ML
INJECTION INTRAMUSCULAR; INTRAVENOUS
Status: DISCONTINUED | OUTPATIENT
Start: 2022-03-15 | End: 2022-03-15

## 2022-03-15 RX ORDER — HYDROMORPHONE HCL IN 0.9% NACL 6 MG/30 ML
PATIENT CONTROLLED ANALGESIA SYRINGE INTRAVENOUS CONTINUOUS
Status: DISCONTINUED | OUTPATIENT
Start: 2022-03-15 | End: 2022-03-17

## 2022-03-15 RX ORDER — NALOXONE HCL 0.4 MG/ML
0.02 VIAL (ML) INJECTION
Status: DISCONTINUED | OUTPATIENT
Start: 2022-03-15 | End: 2022-03-23 | Stop reason: HOSPADM

## 2022-03-15 RX ORDER — SODIUM CHLORIDE, SODIUM LACTATE, POTASSIUM CHLORIDE, CALCIUM CHLORIDE 600; 310; 30; 20 MG/100ML; MG/100ML; MG/100ML; MG/100ML
INJECTION, SOLUTION INTRAVENOUS CONTINUOUS
Status: DISCONTINUED | OUTPATIENT
Start: 2022-03-15 | End: 2022-03-18

## 2022-03-15 RX ORDER — METRONIDAZOLE 500 MG/100ML
500 INJECTION, SOLUTION INTRAVENOUS
Status: COMPLETED | OUTPATIENT
Start: 2022-03-15 | End: 2022-03-15

## 2022-03-15 RX ORDER — PROCHLORPERAZINE EDISYLATE 5 MG/ML
5 INJECTION INTRAMUSCULAR; INTRAVENOUS EVERY 30 MIN PRN
Status: DISCONTINUED | OUTPATIENT
Start: 2022-03-15 | End: 2022-03-15 | Stop reason: HOSPADM

## 2022-03-15 RX ORDER — EPHEDRINE SULFATE 50 MG/ML
INJECTION, SOLUTION INTRAVENOUS
Status: DISCONTINUED | OUTPATIENT
Start: 2022-03-15 | End: 2022-03-15

## 2022-03-15 RX ORDER — CHLORHEXIDINE GLUCONATE ORAL RINSE 1.2 MG/ML
10 SOLUTION DENTAL 2 TIMES DAILY
Status: COMPLETED | OUTPATIENT
Start: 2022-03-15 | End: 2022-03-20

## 2022-03-15 RX ADMIN — HEPARIN SODIUM 5000 UNITS: 5000 INJECTION INTRAVENOUS; SUBCUTANEOUS at 08:03

## 2022-03-15 RX ADMIN — EPHEDRINE SULFATE 30 MG: 50 INJECTION INTRAVENOUS at 01:03

## 2022-03-15 RX ADMIN — CELECOXIB 400 MG: 100 CAPSULE ORAL at 08:03

## 2022-03-15 RX ADMIN — SODIUM CHLORIDE, SODIUM LACTATE, POTASSIUM CHLORIDE, AND CALCIUM CHLORIDE: .6; .31; .03; .02 INJECTION, SOLUTION INTRAVENOUS at 01:03

## 2022-03-15 RX ADMIN — SODIUM CHLORIDE, SODIUM LACTATE, POTASSIUM CHLORIDE, AND CALCIUM CHLORIDE: .6; .31; .03; .02 INJECTION, SOLUTION INTRAVENOUS at 02:03

## 2022-03-15 RX ADMIN — EPHEDRINE SULFATE 20 MG: 50 INJECTION INTRAVENOUS at 02:03

## 2022-03-15 RX ADMIN — CHLORHEXIDINE GLUCONATE 0.12% ORAL RINSE 10 ML: 1.2 LIQUID ORAL at 09:03

## 2022-03-15 RX ADMIN — ACETAMINOPHEN 1000 MG: 10 INJECTION INTRAVENOUS at 09:03

## 2022-03-15 RX ADMIN — ROCURONIUM BROMIDE 30 MG: 10 INJECTION, SOLUTION INTRAVENOUS at 01:03

## 2022-03-15 RX ADMIN — SODIUM CHLORIDE, SODIUM LACTATE, POTASSIUM CHLORIDE, AND CALCIUM CHLORIDE: .6; .31; .03; .02 INJECTION, SOLUTION INTRAVENOUS at 12:03

## 2022-03-15 RX ADMIN — METRONIDAZOLE 500 MG: 500 SOLUTION INTRAVENOUS at 12:03

## 2022-03-15 RX ADMIN — ROCURONIUM BROMIDE 50 MG: 10 INJECTION, SOLUTION INTRAVENOUS at 12:03

## 2022-03-15 RX ADMIN — LIDOCAINE HYDROCHLORIDE 100 MG: 20 INJECTION, SOLUTION EPIDURAL; INFILTRATION; INTRACAUDAL; PERINEURAL at 12:03

## 2022-03-15 RX ADMIN — GABAPENTIN 800 MG: 400 CAPSULE ORAL at 08:03

## 2022-03-15 RX ADMIN — PROPOFOL 100 MG: 10 INJECTION, EMULSION INTRAVENOUS at 12:03

## 2022-03-15 RX ADMIN — SODIUM CHLORIDE, SODIUM LACTATE, POTASSIUM CHLORIDE, AND CALCIUM CHLORIDE: .6; .31; .03; .02 INJECTION, SOLUTION INTRAVENOUS at 05:03

## 2022-03-15 RX ADMIN — FENTANYL CITRATE 100 MCG: 50 INJECTION, SOLUTION INTRAMUSCULAR; INTRAVENOUS at 01:03

## 2022-03-15 RX ADMIN — ONDANSETRON 4 MG: 2 INJECTION, SOLUTION INTRAMUSCULAR; INTRAVENOUS at 02:03

## 2022-03-15 RX ADMIN — ACETAMINOPHEN 1000 MG: 500 TABLET ORAL at 08:03

## 2022-03-15 RX ADMIN — SUGAMMADEX 200 MG: 100 INJECTION, SOLUTION INTRAVENOUS at 02:03

## 2022-03-15 RX ADMIN — CEFTRIAXONE SODIUM 2 G: 2 INJECTION, SOLUTION INTRAVENOUS at 12:03

## 2022-03-15 RX ADMIN — Medication: at 05:03

## 2022-03-15 NOTE — PLAN OF CARE
Pt remains free of falls/injury this shift. Safety precautions maintained. Pain managed with PCA pump. IVFs infusing. VSS. No signs and symptoms of acute distress noted at this time. 12 hour chart check completed. Will continue to monitor.

## 2022-03-15 NOTE — TRANSFER OF CARE
"Anesthesia Transfer of Care Note    Patient: Jag Tipton    Procedure(s) Performed: Procedure(s) (LRB):  COLECTOMY, RIGHT OPEN WITH END ILEOSTOMY CONSTRUCTION (N/A)  BIOPSY, LIVER (N/A)  BIOPSY, PERITONEUM (N/A)  BIOPSY, ABDOMINAL WALL (N/A)  BLOCK, TRANSVERSUS ABDOMINIS PLANE (N/A)  HEMICOLECTOMY, RIGHT (Right)    Patient location: PACU    Anesthesia Type: general    Transport from OR: Transported from OR on room air with adequate spontaneous ventilation    Post pain: adequate analgesia    Post assessment: no apparent anesthetic complications    Post vital signs: stable    Level of consciousness: awake    Nausea/Vomiting: no nausea/vomiting    Complications: none    Transfer of care protocol was followed      Last vitals:   Visit Vitals  BP (!) 105/52   Pulse 73   Temp 36.9 °C (98.4 °F)   Resp 18   Ht 6' 6" (1.981 m)   Wt 62.5 kg (137 lb 12.6 oz)   SpO2 98%   BMI 15.92 kg/m²     "

## 2022-03-15 NOTE — ANESTHESIA POSTPROCEDURE EVALUATION
Anesthesia Post Evaluation    Patient: Jag Tipton    Procedure(s) Performed: Procedure(s) (LRB):  COLECTOMY, RIGHT OPEN WITH END ILEOSTOMY CONSTRUCTION (N/A)  BIOPSY, LIVER (N/A)  BIOPSY, PERITONEUM (N/A)  BIOPSY, ABDOMINAL WALL (N/A)  BLOCK, TRANSVERSUS ABDOMINIS PLANE (N/A)  HEMICOLECTOMY, RIGHT (Right)    Final Anesthesia Type: general      Patient location during evaluation: PACU  Patient participation: Yes- Able to Participate  Level of consciousness: awake and alert  Post-procedure vital signs: reviewed and stable  Pain management: adequate  Airway patency: patent  CRISTOFER mitigation strategies: Verification of full reversal of neuromuscular block  PONV status at discharge: No PONV  Anesthetic complications: no      Cardiovascular status: hemodynamically stable  Respiratory status: spontaneous ventilation  Hydration status: euvolemic  Follow-up not needed.          Vitals Value Taken Time   BP 98/52 03/15/22 1650   Temp 37 °C (98.6 °F) 03/15/22 1645   Pulse 99 03/15/22 1650   Resp 20 03/15/22 1650   SpO2 99 % 03/15/22 1650   Vitals shown include unvalidated device data.      Event Time   Out of Recovery 16:51:00         Pain/Tadeo Score: Pain Rating Prior to Med Admin: 8 (3/15/2022  8:46 AM)  Tadeo Score: 8 (3/15/2022  4:45 PM)

## 2022-03-15 NOTE — CONSULTS
"[facility] - Surgery ([facility])  Wound Care    Patient Name:  Jag Tipton   MRN:  [id number]  Date: 3/15/2022  Diagnosis: <principal problem not specified>    History:     Past Medical History:   Diagnosis Date    Colon cancer 02/2022    cecal       Social History     Socioeconomic History    Marital status: Legally    Tobacco Use    Smoking status: Current Every Day Smoker     Packs/day: 1.00     Years: 65.00     Pack years: 65.00     Types: Cigarettes    Smokeless tobacco: Never Used   Substance and Sexual Activity    Alcohol use: Not Currently    Drug use: Never    Sexual activity: Not Currently     Partners: Female       Precautions:     Allergies as of 03/07/2022    (No Known Allergies)       [facility] Assessment Details/Treatment     Consulted on this 82 y/o M patient for pre-op stoma marking. 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 Castro. Procedure is palliative in nature and per patient and Daughter at bedside, plan to discharge home with hospice post-op. Patient lives with family members. He is awake and alert, weight 137lbs, 6'6". Abdomen assessed supine flat, sitting, twisting. Mild distention noted to RLQ likely related to tumor, some loose skin, abdomen concave. Abd scrubbed with chloraprep surgical scrub, allowed to air dry, then marked to RUQ and RLQ with sterile surgical marker and covered with tegaderm.   Ostomy education initiated, plans made to follow up tomorrow for ongoing ostomy education.   03/15/2022  "

## 2022-03-15 NOTE — OP NOTE
[facility] - Surgery ([facility])  Surgery Department  Operative Note    SUMMARY     Date of Procedure: 3/15/2022     Procedure:  1. Open right hemicolectomy  2. Liver biopsy  3. Peritoneum biopsy  4. Abdominal wall biopsy  5. End ileostomy construction  6. Transversus abdominis plane block    Surgeon(s) and Role:     * Michael Castro MD - Primary    Assisting Surgeon: None    Pre-Operative Diagnosis: Malignant neoplasm of ascending colon [C18.2]    Post-Operative Diagnosis: Post-Op Diagnosis Codes:     * Malignant neoplasm of ascending colon [C18.2]    Anesthesia: General    Technical Procedures Used:   1. Open right hemicolectomy  2. Liver biopsy  3. Peritoneum biopsy  4. Abdominal wall biopsy  5. End ileostomy construction  6. Transversus abdominis plane block    Indications for Procedure:  81-year-old male with right colon adenocarcinoma with palpable mass in the right lower quadrant severe pain who presents for definitive surgical management    Findings of the Procedure:  Right colon adenocarcinoma with evidence of perforation into the right lateral and right anterior abdominal sidewall with peritoneal seeding, likely residual carcinoma stuck to the duodenum and evidence of metastatic disease in both lobes of the liver    Description of the Procedure:  Patient was brought to the operating room and placed supine on table.  General endotracheal anesthesia was induced.  A Powell catheter was then sterilely placed.  The abdomen was then prepped and draped usual sterile fashion.  A preoperative surgical time-out was performed confirming the correct patient, procedure and preop medications given.  A midline laparotomy was made through the umbilicus and dissection carried into the abdominal cavity in usual fashion.  There was no injury to underlying structures upon entry.  The incision was extended inferiorly and superiorly above and below the umbilicus to allow for adequate visualization.  A wound protector was placed.   The right lower quadrant was evaluated and there was obvious large tumor of the cecum and right colon attached to the anterior abdominal wall with evidence of likely tumor perforation.  Upon palpation, there was obvious evidence of bilateral metastatic disease within the liver most prominent on the right side.  The rest the abdomen was explored there was no other evidence of metastatic disease.    Attention was then turned to the right colon.  The area above the colon was evaluated on the anterior abdominal wall.  This was stuck to the point where it could not be retracted into the field to allow for full visualization of the duodenum and the other structures.  Given the perforated nature and the metastatic disease with the known symptoms, the patient would need resection of this not just a bypass.  A incision was made using electrocautery on the peritoneum just outside the site of the attachment to the anterior abdominal wall in the perforation site.  Dissection was carried down into the abdominal wall where there was obvious tumor budding into the anterior abdominal wall.  This was done circumferentially around the area of perforation and the tumor was dissected off of the anterior abdominal wall in this fashion as well as the lateral abdominal wall.  The tumor was also stuck to the posterior peritoneum in lining including the retroperitoneum.  Once this was fully dissected off, the right colon was lifted medially.  There was evidence of perforation of the colon and spillage of stool.  The white line of Toldt was incised all way up the hepatic flexure.  The duodenum was then identified and there was a portion on the inferior portion of the duodenum were that did feel there was some tumor attachment to this area.  The transverse colon was then evaluated and found to be without evidence of disease.  A point on the mid transverse colon was chosen and the lesser sac was entered.  Dissection was then carried from the  lesser sac all the way to the hepatic flexure and the duodenum was further identified.  The majority the duodenum was protected and the hepatic flexure was mobilized by taking down all the superiorly based attachments with care taken to protect the duodenum.    The lateral attachments of the ascending colon were then sharply taken down to fully mobilize the right colon.  The tumor was found to be adherent to a section of tissue just inferior to the duodenum that was also attached the duodenum concerning for possible adhesion to the duodenum.  This tissue was transected near the duodenum but allowed a healthy rim tissue to be attached to the duodenum so that the lumen was not entered or the serosa was not injured.  This was likely still residual tumor.  Once this was completed this allowed the right colon tumor to be completely free of surrounding structures.  A mesenteric window was made beneath the transverse colon at the chosen point of transection and the mesocolon was divided all the way to level the tumor using the EnSeal device.  Once this was accomplished the remaining mesentery was divided to 5 cm proximal to the ileocecal valve using the EnSeal with care taken to protect the surrounding structures including the ureter.  Once this was accomplished a mesenteric window was made beneath the terminal ileum at this location and the mesentery was divided of the small bowel using the EnSeal device.  The terminal ileum was then stapled using a ARPAN 75 blue load stapler to transect the terminal ileum.  The point on the transverse colon was then transected using a ARPAN 75 blue load stapler as well.  The right colon was then passed off the field for final pathology.    Attention was turned back to the right pericolic gutter.  There was obvious thickened tissue still stuck to the duodenum as expected and a biopsy of this was taken with a 10 blade using forceps without injury to the duodenum itself and sent for duodenal  margin.    Attention was then turned to the peritoneal surface of the retroperitoneum.  There was obvious thickened tissue along the pericolic gutter which was also likely residual tumor.  This was grasped with a clamp and will was biopsied using a 10 blade and sent for biopsy of the peritoneum.    On the anterior abdominal wall, there was residual thickened tissue was well where the tumor had perforated and a biopsy of this perforation site was taken using a 10 blade and sent for final pathology.    Attention was then turned to the liver.  As the point on the liver on the right lobe was chosen on the inferior border of the liver above the gallbladder where there was obvious 2-3 cm lesion consistent with metastatic disease.  This was biopsied using a 10 blade and passed off the field final pathology.  This area was made hemostatic with electrocautery and a piece of Surgicel was placed in the biopsy cavity.    The rest of the abdomen was then copiously irrigated found to be hemostatic.  All the mesentery was checked and found to be hemostatic.  A premarked ostomy site on the right upper quadrant was chosen.  The wound protector was removed.  The skin rim was circumferentially excised, dissection was carried down to level of fascia which was incised in a longitudinal manner, the rectus muscle was spread and the posterior fascia peritoneum was incised the enlarging manner to accommodate 2 fingerbreadths.  The stapled ends of the terminal ileum was then brought through this defect and reach easily above the skin level.  A 15 Setswana KIRK drain was then brought through the left abdomen and coursed into the right pericolic gutter where the tumor had perforated incision placed using a 2-0 silk suture.    A transversus abdominis plane block was then performed using a mixture of 20 cc of Exparel, 30 cc of 0.25% bupivacaine plain and 10 cc of injectable saline.  30 cc was injected bilaterally into the transversus abdominis plane  for a total of 60 cc given for the block.  The abdominal cavity was then copiously irrigated found to be hemostatic.  The midline incision fascia was then closed using #1 looped PDS suture.  The umbilicus was reapproximated using 3-0 Vicryl suture.  The skin was then very loosely stapled shut and Betadine-soaked Telfa bolivar were placed between the staples to prevent primary closure.    Attention was then turned to the ostomy site.  The staple line of the terminal ileum was excised using electrocautery.  For Brooking sutures were placed using 3-0 Vicryl suture in usual fashion.  The mucocutaneous junction was then circumferentially approximated using interrupted 3-0 Vicryl sutures in usual fashion.  A stoma appliance was applied.  Surgical dressings were applied.  The patient was then awoken from general endotracheal anesthesia and taken to the postanesthesia care in stable condition.  He tolerated procedure well.  All sponge, needle and instrument counts were correct at the end of the case.    Significant Surgical Tasks Conducted by the Assistant(s), if Applicable: N/A    Complications: No    Estimated Blood Loss (EBL): 50mL           Implants: * No implants in log *    Specimens:   Specimen (24h ago, onward)             Start     Ordered    03/15/22 1439  Specimen to Pathology, Surgery General Surgery  Once        Comments: Pre-op Diagnosis: Malignant neoplasm of ascending colon [C18.2]    Procedure(s):  COLECTOMY, RIGHT OPEN WITH END ILEOSTOMY CONSTRUCTION     Number of specimens: 5    Name of specimens: 1. Right colon.  2. Duodenal margins.  3. Liver biopsy.  4. Abdominal wall biopsy. 5. Peritoneum biopsy.   References:    Click here for ordering Quick Tip   Question Answer Comment   Procedure Type: General Surgery    Specimen Class: Routine/Screening    Release to patient Immediate        03/15/22 1440                        Condition: Good    Disposition: PACU - hemodynamically stable.    Attestation: I  performed the procedure.

## 2022-03-15 NOTE — PLAN OF CARE
Dr. Drew to bedside to assess pt's mentation and sedation level. Orders obtained to start with obtaining a blood glucose level.

## 2022-03-15 NOTE — ANESTHESIA PREPROCEDURE EVALUATION
03/15/2022  Jag Tipton is a 81 y.o., male.      Pre-op Assessment    I have reviewed the Patient Summary Reports.    I have reviewed the NPO Status.   I have reviewed the Medications.     Review of Systems  Anesthesia Hx:  Denies Family Hx of Anesthesia complications.   Denies Personal Hx of Anesthesia complications.   Social:  Smoker    Hematology/Oncology:  Hematology Normal   Oncology Normal     EENT/Dental:EENT/Dental Normal   Cardiovascular:   Denies Hypertension.  Denies CAD.    Denies Dysrhythmias.      RBBB on EKG today   Pulmonary:  Pulmonary Normal    Renal/:  Renal/ Normal     Hepatic/GI:   Large cecal cancer with no apparent mets. However patient is cachectic   Musculoskeletal:  Musculoskeletal Normal    Neurological:  Neurology Normal    Endocrine:  Endocrine Normal    Dermatological:  Skin Normal    Psych:  Psychiatric Normal           Physical Exam  General: Alert, Oriented and Cachexia    Airway:  Mallampati: II   Mouth Opening: Normal  TM Distance: Normal  Tongue: Normal  Neck ROM: Normal ROM        Anesthesia Plan  Type of Anesthesia, risks & benefits discussed:    Anesthesia Type: Gen ETT  Intra-op Monitoring Plan: Standard ASA Monitors  Post Op Pain Control Plan: multimodal analgesia  Induction:  IV  Airway Plan: Direct  Informed Consent: Informed consent signed with the Patient and all parties understand the risks and agree with anesthesia plan.  All questions answered. Patient consented to blood products? Yes  ASA Score: 3  Day of Surgery Review of History & Physical: I have interviewed and examined the patient. I have reviewed the patient's H&P dated:     Ready For Surgery From Anesthesia Perspective.     .

## 2022-03-15 NOTE — ANESTHESIA RELEASE NOTE
"Anesthesia Release from PACU Note    Patient: Jag Tipton    Procedure(s) Performed: Procedure(s) (LRB):  COLECTOMY, RIGHT OPEN WITH END ILEOSTOMY CONSTRUCTION (N/A)  BIOPSY, LIVER (N/A)  BIOPSY, PERITONEUM (N/A)  BIOPSY, ABDOMINAL WALL (N/A)  BLOCK, TRANSVERSUS ABDOMINIS PLANE (N/A)  HEMICOLECTOMY, RIGHT (Right)    Anesthesia type: general    Post pain: Adequate analgesia    Post assessment: no apparent anesthetic complications    Last Vitals:   Visit Vitals  BP (!) 105/52   Pulse 73   Temp 36.9 °C (98.4 °F)   Resp 18   Ht 6' 6" (1.981 m)   Wt 62.5 kg (137 lb 12.6 oz)   SpO2 98%   BMI 15.92 kg/m²       Post vital signs: stable    Level of consciousness: awake    Nausea/Vomiting: no nausea/no vomiting    Complications: none    Airway Patency: patent    Respiratory: unassisted    Cardiovascular: stable and blood pressure at baseline    Hydration: euvolemic  "

## 2022-03-16 LAB
ALBUMIN SERPL BCP-MCNC: 2.1 G/DL (ref 3.5–5.2)
ALP SERPL-CCNC: 79 U/L (ref 55–135)
ALT SERPL W/O P-5'-P-CCNC: 10 U/L (ref 10–44)
ANION GAP SERPL CALC-SCNC: 9 MMOL/L (ref 8–16)
AST SERPL-CCNC: 15 U/L (ref 10–40)
BILIRUB SERPL-MCNC: 0.5 MG/DL (ref 0.1–1)
BUN SERPL-MCNC: 23 MG/DL (ref 8–23)
CALCIUM SERPL-MCNC: 8.7 MG/DL (ref 8.7–10.5)
CHLORIDE SERPL-SCNC: 104 MMOL/L (ref 95–110)
CO2 SERPL-SCNC: 24 MMOL/L (ref 23–29)
CREAT SERPL-MCNC: 0.6 MG/DL (ref 0.5–1.4)
ERYTHROCYTE [DISTWIDTH] IN BLOOD BY AUTOMATED COUNT: 14.9 % (ref 11.5–14.5)
EST. GFR  (AFRICAN AMERICAN): >60 ML/MIN/1.73 M^2
EST. GFR  (NON AFRICAN AMERICAN): >60 ML/MIN/1.73 M^2
GLUCOSE SERPL-MCNC: 103 MG/DL (ref 70–110)
HCT VFR BLD AUTO: 36.6 % (ref 40–54)
HGB BLD-MCNC: 11.6 G/DL (ref 14–18)
MCH RBC QN AUTO: 28.4 PG (ref 27–31)
MCHC RBC AUTO-ENTMCNC: 31.7 G/DL (ref 32–36)
MCV RBC AUTO: 90 FL (ref 82–98)
PLATELET # BLD AUTO: 477 K/UL (ref 150–450)
PMV BLD AUTO: 10.8 FL (ref 9.2–12.9)
POTASSIUM SERPL-SCNC: 4 MMOL/L (ref 3.5–5.1)
PROT SERPL-MCNC: 5.3 G/DL (ref 6–8.4)
RBC # BLD AUTO: 4.09 M/UL (ref 4.6–6.2)
SODIUM SERPL-SCNC: 137 MMOL/L (ref 136–145)
WBC # BLD AUTO: 15.8 K/UL (ref 3.9–12.7)

## 2022-03-16 PROCEDURE — 36415 COLL VENOUS BLD VENIPUNCTURE: CPT

## 2022-03-16 PROCEDURE — 99900035 HC TECH TIME PER 15 MIN (STAT)

## 2022-03-16 PROCEDURE — 25000003 PHARM REV CODE 250: Performed by: COLON & RECTAL SURGERY

## 2022-03-16 PROCEDURE — 63600175 PHARM REV CODE 636 W HCPCS: Performed by: COLON & RECTAL SURGERY

## 2022-03-16 PROCEDURE — 99024 POSTOP FOLLOW-UP VISIT: CPT | Mod: POP,,, | Performed by: COLON & RECTAL SURGERY

## 2022-03-16 PROCEDURE — 11000001 HC ACUTE MED/SURG PRIVATE ROOM

## 2022-03-16 PROCEDURE — 80053 COMPREHEN METABOLIC PANEL: CPT

## 2022-03-16 PROCEDURE — 51798 US URINE CAPACITY MEASURE: CPT

## 2022-03-16 PROCEDURE — 25000003 PHARM REV CODE 250

## 2022-03-16 PROCEDURE — 99024 PR POST-OP FOLLOW-UP VISIT: ICD-10-PCS | Mod: POP,,, | Performed by: COLON & RECTAL SURGERY

## 2022-03-16 PROCEDURE — 85027 COMPLETE CBC AUTOMATED: CPT

## 2022-03-16 RX ORDER — ACETAMINOPHEN 325 MG/1
650 TABLET ORAL EVERY 6 HOURS
Status: DISCONTINUED | OUTPATIENT
Start: 2022-03-16 | End: 2022-03-23 | Stop reason: HOSPADM

## 2022-03-16 RX ADMIN — CHLORHEXIDINE GLUCONATE 0.12% ORAL RINSE 10 ML: 1.2 LIQUID ORAL at 09:03

## 2022-03-16 RX ADMIN — PIPERACILLIN AND TAZOBACTAM 4.5 G: 4; .5 INJECTION, POWDER, LYOPHILIZED, FOR SOLUTION INTRAVENOUS; PARENTERAL at 09:03

## 2022-03-16 RX ADMIN — ACETAMINOPHEN 1000 MG: 10 INJECTION INTRAVENOUS at 05:03

## 2022-03-16 RX ADMIN — PIPERACILLIN AND TAZOBACTAM 4.5 G: 4; .5 INJECTION, POWDER, LYOPHILIZED, FOR SOLUTION INTRAVENOUS; PARENTERAL at 11:03

## 2022-03-16 RX ADMIN — ACETAMINOPHEN 650 MG: 325 TABLET ORAL at 09:03

## 2022-03-16 RX ADMIN — ACETAMINOPHEN 1000 MG: 10 INJECTION INTRAVENOUS at 02:03

## 2022-03-16 NOTE — PLAN OF CARE
Patient remained free from injury. Family at bedside. PCA in use. Don drain and wylie cath maintained. 12hr chart check complete.

## 2022-03-16 NOTE — PLAN OF CARE
VSS. Fall precautions maintained.   Pain is well controlled. PCA as ordered.  Dressing intact and clean, KIRK drain intact.   IV fluids maintained.   Tolerating IV antibiotics.  Repositioned q2hrs and as needed.  Family at bedside.   All needs met. Will continue to monitor.

## 2022-03-16 NOTE — HOSPITAL COURSE
03/15/2022: Underwent open right hemicolectomy with end ileostomy construction    03/16/2022: POD 1. Pain well controlled. Having small amount of ileostomy function. Denies nausea or vomiting. Has not ambulated postop.    03/17/2022: POD 2. Pain well controlled. Having more significant ileostomy function. Denies nausea or vomiting. Minimal ambulation still.    03/18/2022: POD 3. Pain well controlled. Having high ileostomy output (>1L per day). No nausea or vomiting. Tolerating diet well.    3/19/2022 POD4 patient without complaints of pain.  Tolerating p.o..  High ileostomy output over 24 hours.  1200 mL yesterday.  Much decreased today.  Was started on Imodium Friday evening.  Denies any nausea vomiting.  Incision looks good without signs of any infection or dehiscence.  Will observe patient today.  If output is under 1000 ml then anticipate discharge tomorrow.    03/20/2022.  Postop day 5. Does not complain of pain.  Has high ileostomy output with approximately 1.9 L yesterday.  He was started on Imodium on Friday.  He denies any nausea vomiting.  He has some mild hyponatremia.  Will start some IV fluids and monitor ileostomy output    03/21/2022: POD 6. Does not complain of pain. Reports some ambulation and minimal oral intake. 1.7 L ostomy output yesterday. Denies fevers, chills, nausea, and vomiting.     03/22/2022: POD 7. Denies pain. Minimal oral intake. 1.4 L output yesterday. Imodium increased to QID yesterday.    03/23/2022: POD 8. Pain well controlled. 750mL ostomy output, which is thicker. Tolerating diet. Ready for discharge home to resume hospice.

## 2022-03-16 NOTE — ASSESSMENT & PLAN NOTE
80yo M with R colon adenocarcinoma now s/p open RHC with end ileostomy on 3/15/22    - cont CLD for now, monitor for tolerance  - IVF  - PCA for pain control  - [facility] c/s for stoma education, care and supplies  - PPx: LVNX  - IS 10xs/hr while awake  - OOB, ambulation encouraged. PT/OT ordered given preop deconditioning

## 2022-03-16 NOTE — CONSULTS
[facility] Surg  Wound Care    Patient Name:  Jag Tipton   MRN:  [id number]  Date: 3/16/2022  Diagnosis: Malignant neoplasm of ascending colon    History:     Past Medical History:   Diagnosis Date    Colon cancer 02/2022    cecal       Social History     Socioeconomic History    Marital status: Legally    Tobacco Use    Smoking status: Current Every Day Smoker     Packs/day: 1.00     Years: 65.00     Pack years: 65.00     Types: Cigarettes    Smokeless tobacco: Never Used   Substance and Sexual Activity    Alcohol use: Not Currently    Drug use: Never    Sexual activity: Not Currently     Partners: Female       Precautions:     Allergies as of 03/07/2022    (No Known Allergies)       [facility] Assessment Details/Treatment     Consulted on this 82 y/o M patient for new Ileostomy education and management. He is awake and alert, DIL at bedside. He [place] on the property with his son and DIL.   High ileostomy output noted, emptied 400mL thin green watery liquid stool emptied at this time. Surgical pouch removed. Stoma is moist and pink, mildly edematous, measures 30mm. Lazara stomal skin intact. Cleansed with water and gauze, patted dry. Lazara stomal skin sprayed with cavilon. One piece convex coloplast AMANDA high output pouch cut to size and applied after warming barrier. Tolerated care well.  Printed educational material provided to bedside. Verbally reviewed education including:  How/When to empty pouch  How/When to change pouch  Dietary, hygiene, activity guidelines  How to obtain supplies after discharge  High Output Ostomy prevention and management  Provided with box of 1 piece flat AMANDA pouches, cavilon spray to bedside, but may require high output pouches. Plans made to revisit tomorrow for continued teaching.       03/16/22 1015   WOCN Assessment   WOCN Total Time (mins) 60   Visit Date 03/16/22   Visit Time 1015   Consult Type New   WO Speciality Ostomy   WOCN List ileostomy   Ostomy Type Ileostomy    Intervention assessed;changed;applied;chart review;coordination of care;team conference;orders        Ileostomy 03/15/22 1450 Standard (alfredo Blum) RUQ   Placement Date/Time: 03/15/22 1450   Inserted by: MD  Ileostomy Type: Standard (alfredo Blum)  Location: RUQ   Stoma Appearance round;pink;moist;swollen;protruding above skin level   Stoma Size (in) 30mm   Appliance 1-piece;intact;no leakage;changed;other (see comments)  (convex high output)   Accessories/Skin Care cleansed w/ water;skin sealant   Treatment Bag change;Site care   Stoma Function stool;flatus;thin liquid;green   Peristomal Assessment Clean;Intact   Tolerance no signs/symptoms of discomfort   Output (mL) 400 mL       03/16/2022

## 2022-03-16 NOTE — PROGRESS NOTES
[facility] Surg  Colorectal Surgery  Progress Note    Subjective:     History of Present Illness:  82yo M with R colon adenocarcinoma who presents for definitive surgical management      Post-Op Info:  Procedure(s) (LRB):  COLECTOMY, RIGHT OPEN WITH END ILEOSTOMY CONSTRUCTION (N/A)  BIOPSY, LIVER (N/A)  BIOPSY, PERITONEUM (N/A)  BIOPSY, ABDOMINAL WALL (N/A)  BLOCK, TRANSVERSUS ABDOMINIS PLANE (N/A)  HEMICOLECTOMY, RIGHT (Right)   1 Day Post-Op     Interval History: Pain well controlled. Having small amount of ileostomy function. Denies nausea or vomiting. Has not ambulated postop.    Medications:  Continuous Infusions:   hydromorphone in 0.9 % NaCl 6 mg/30 ml      lactated ringers 75 mL/hr at 03/15/22 1915     Scheduled Meds:   chlorhexidine  10 mL Mouth/Throat BID    piperacillin-tazobactam (ZOSYN) IVPB  4.5 g Intravenous Q8H     PRN Meds:albuterol sulfate, naloxone, ondansetron, sodium chloride 0.9%, sodium chloride 0.9%     Review of patient's allergies indicates:  No Known Allergies  Objective:     Vital Signs (Most Recent):  Temp: 97.3 °F (36.3 °C) (03/16/22 1136)  Pulse: 68 (03/16/22 1136)  Resp: 18 (03/16/22 1136)  BP: (!) 96/52 (03/16/22 0852)  SpO2: 100 % (03/16/22 1136)   Vital Signs (24h Range):  Temp:  [93.3 °F (34.1 °C)-98.6 °F (37 °C)] 97.3 °F (36.3 °C)  Pulse:  [68-99] 68  Resp:  [12-20] 18  SpO2:  [94 %-100 %] 100 %  BP: (81-99)/(47-54) 96/52     Weight: 67.5 kg (148 lb 13 oz)  Body mass index is 17.2 kg/m².    Intake/Output - Last 3 Shifts         03/14 0700  03/15 0659 03/15 0700  03/16 0659 03/16 0700  03/17 0659    I.V. (mL/kg)  696.6 (10.3) 1 (0)    IV Piggyback  2000     Total Intake(mL/kg)  2696.6 (39.9) 1 (0)    Urine (mL/kg/hr)  250     Drains  335 200    Stool  200 800    Total Output  785 1000    Net  +1911.6 -999                   Physical Exam  Constitutional:       Appearance: He is well-developed.   HENT:      Head: Normocephalic and atraumatic.   Eyes:      Conjunctiva/sclera:  Conjunctivae normal.   Neck:      Thyroid: No thyromegaly.   Cardiovascular:      Rate and Rhythm: Normal rate and regular rhythm.   Pulmonary:      Effort: Pulmonary effort is normal. No respiratory distress.   Abdominal:      Comments: Soft, nondistended, appropriately TTP; incision c/d/I without erythema or drainage; ostomy pink and viable with liquid bilious stool in bag   Musculoskeletal:         General: No tenderness. Normal range of motion.      Cervical back: Normal range of motion.   Skin:     General: Skin is warm and dry.      Capillary Refill: Capillary refill takes less than 2 seconds.      Findings: No rash.   Neurological:      Mental Status: He is alert and oriented to person, place, and time.       Significant Labs:  I have reviewed all pertinent lab results within the past 24 hours.  CBC:   Recent Labs   Lab 03/16/22  0432   WBC 15.80*   RBC 4.09*   HGB 11.6*   HCT 36.6*   *   MCV 90   MCH 28.4   MCHC 31.7*     BMP:   Recent Labs   Lab 03/16/22 0432         K 4.0      CO2 24   BUN 23   CREATININE 0.6   CALCIUM 8.7       Significant Diagnostics:  I have reviewed all pertinent imaging results/findings within the past 24 hours.    Assessment/Plan:     * Malignant neoplasm of ascending colon  80yo M with R colon adenocarcinoma now s/p open RHC with end ileostomy on 3/15/22    - cont CLD for now, monitor for tolerance  - IVF  - PCA for pain control  - WCON c/s for stoma education, care and supplies  - PPx: LVNX  - IS 10xs/hr while awake  - OOB, ambulation encouraged. PT/OT ordered given preop deconditioning        Michael Castro MD  Colorectal Surgery  [facility] - Med S[facility]

## 2022-03-16 NOTE — SUBJECTIVE & OBJECTIVE
Interval History: Pain well controlled. Having small amount of ileostomy function. Denies nausea or vomiting. Has not ambulated postop.    Medications:  Continuous Infusions:   hydromorphone in 0.9 % NaCl 6 mg/30 ml      lactated ringers 75 mL/hr at 03/15/22 1915     Scheduled Meds:   chlorhexidine  10 mL Mouth/Throat BID    piperacillin-tazobactam (ZOSYN) IVPB  4.5 g Intravenous Q8H     PRN Meds:albuterol sulfate, naloxone, ondansetron, sodium chloride 0.9%, sodium chloride 0.9%     Review of patient's allergies indicates:  No Known Allergies  Objective:     Vital Signs (Most Recent):  Temp: 97.3 °F (36.3 °C) (03/16/22 1136)  Pulse: 68 (03/16/22 1136)  Resp: 18 (03/16/22 1136)  BP: (!) 96/52 (03/16/22 0852)  SpO2: 100 % (03/16/22 1136)   Vital Signs (24h Range):  Temp:  [93.3 °F (34.1 °C)-98.6 °F (37 °C)] 97.3 °F (36.3 °C)  Pulse:  [68-99] 68  Resp:  [12-20] 18  SpO2:  [94 %-100 %] 100 %  BP: (81-99)/(47-54) 96/52     Weight: 67.5 kg (148 lb 13 oz)  Body mass index is 17.2 kg/m².    Intake/Output - Last 3 Shifts         03/14 0700  03/15 0659 03/15 0700  03/16 0659 03/16 0700  03/17 0659    I.V. (mL/kg)  696.6 (10.3) 1 (0)    IV Piggyback  2000     Total Intake(mL/kg)  2696.6 (39.9) 1 (0)    Urine (mL/kg/hr)  250     Drains  335 200    Stool  200 800    Total Output  785 1000    Net  +1911.6 -999                   Physical Exam  Constitutional:       Appearance: He is well-developed.   HENT:      Head: Normocephalic and atraumatic.   Eyes:      Conjunctiva/sclera: Conjunctivae normal.   Neck:      Thyroid: No thyromegaly.   Cardiovascular:      Rate and Rhythm: Normal rate and regular rhythm.   Pulmonary:      Effort: Pulmonary effort is normal. No respiratory distress.   Abdominal:      Comments: Soft, nondistended, appropriately TTP; incision c/d/I without erythema or drainage; ostomy pink and viable with liquid bilious stool in bag   Musculoskeletal:         General: No tenderness. Normal range of motion.       Cervical back: Normal range of motion.   Skin:     General: Skin is warm and dry.      Capillary Refill: Capillary refill takes less than 2 seconds.      Findings: No rash.   Neurological:      Mental Status: He is alert and oriented to person, place, and time.       Significant Labs:  I have reviewed all pertinent lab results within the past 24 hours.  CBC:   Recent Labs   Lab 03/16/22  0432   WBC 15.80*   RBC 4.09*   HGB 11.6*   HCT 36.6*   *   MCV 90   MCH 28.4   MCHC 31.7*     BMP:   Recent Labs   Lab 03/16/22  0432         K 4.0      CO2 24   BUN 23   CREATININE 0.6   CALCIUM 8.7       Significant Diagnostics:  I have reviewed all pertinent imaging results/findings within the past 24 hours.

## 2022-03-16 NOTE — PLAN OF CARE
[facility]  Initial Discharge Assessment       Primary Care Provider: Salvador Yanez NP    Admission Diagnosis: Malignant neoplasm of ascending colon [C18.2]    Admission Date: 3/15/2022  Expected Discharge Date:     Discharge Barriers Identified: None    Payor: MEDICARE / Plan: MEDICARE PART A & B / Product Type: Government /     Extended Emergency Contact Information  Primary Emergency Contact: Jag Tipton  Mobile Phone: [phone]  Relation: Son  Preferred language: English   needed? No  Secondary Emergency Contact: Ana Tipton  Mobile Phone: [phone]  Relation: Daughter  Preferred language: English   needed? No    Discharge Plan A: Hospice/home  Discharge Plan B: Hospice/home      [facility] - [place] - [address]  [address] [facility] [place] [address]  Phone: [phone] Fax: [phone]      Initial Assessment (most recent)     Adult Discharge Assessment - 03/16/22 1033        Discharge Assessment    Assessment Type Discharge Planning Assessment     Confirmed/corrected address, phone number and insurance Yes     Confirmed Demographics Correct on Facesheet     Source of Information patient     Communicated LISE with patient/caregiver Date not available/Unable to determine     Reason For Admission Malignant neoplasm     Lives With alone   Lives in a trailer on the same property with his son and daughter-n-law    Facility Arrived From: home     Do you expect to return to your current living situation? Yes     Do you have help at home or someone to help you manage your care at home? Yes     Who are your caregiver(s) and their phone number(s)? Ana Tipton (Daughter-n-law) [phone]     Prior to hospitilization cognitive status: Alert/Oriented     Current cognitive status: Alert/Oriented     Walking or Climbing Stairs Difficulty ambulation difficulty, requires equipment     Dressing/Bathing Difficulty none     Home Accessibility wheelchair accessible      Home Layout Able to live on 1st floor     Equipment Currently Used at Home walker, rolling;wheelchair;oxygen   WC and oxygen concentratot when needed    Readmission within 30 days? No     Patient currently being followed by outpatient case management? No     Do you currently have service(s) that help you manage your care at home? Yes     Name and Contact number of agency [facility] [phone]     Is the pt/caregiver preference to resume services with current agency Yes     Do you take prescription medications? Yes     Do you have prescription coverage? Yes     Coverage Medicare A&B     Do you have any problems affording any of your prescribed medications? No     Is the patient taking medications as prescribed? yes     Who is going to help you get home at discharge? Patients Daughter-n-law Ana     How do you get to doctors appointments? car, drives self;family or friend will provide     Are you on dialysis? No     Do you take coumadin? No     Discharge Plan A Hospice/home     Discharge Plan B Hospice/home     DME Needed Upon Discharge  none     Discharge Plan discussed with: Patient     Discharge Barriers Identified None               Initial assessment completed with patient and patients daughter n law Perez. Patient reports independence with ADL's  prior to hospitalization. Patient uses a walker, WC, and Oxygen concentrator as needed at home. Patient currently has no cm needs upon DC. CM will continue following to assist with other needs.   CM provided information on advanced directives, information on pharmacy bedside delivery, and discharge planning begins on admission with contact information for any needs/questions.

## 2022-03-17 PROBLEM — E43 SEVERE MALNUTRITION: Status: ACTIVE | Noted: 2022-03-17

## 2022-03-17 LAB
ALBUMIN SERPL BCP-MCNC: 2.1 G/DL (ref 3.5–5.2)
ALP SERPL-CCNC: 68 U/L (ref 55–135)
ALT SERPL W/O P-5'-P-CCNC: 10 U/L (ref 10–44)
ANION GAP SERPL CALC-SCNC: 5 MMOL/L (ref 8–16)
AST SERPL-CCNC: 17 U/L (ref 10–40)
BASOPHILS # BLD AUTO: 0.04 K/UL (ref 0–0.2)
BASOPHILS NFR BLD: 0.3 % (ref 0–1.9)
BILIRUB SERPL-MCNC: 1 MG/DL (ref 0.1–1)
BUN SERPL-MCNC: 19 MG/DL (ref 8–23)
CALCIUM SERPL-MCNC: 8.3 MG/DL (ref 8.7–10.5)
CHLORIDE SERPL-SCNC: 100 MMOL/L (ref 95–110)
CO2 SERPL-SCNC: 27 MMOL/L (ref 23–29)
CREAT SERPL-MCNC: 0.6 MG/DL (ref 0.5–1.4)
DIFFERENTIAL METHOD: ABNORMAL
EOSINOPHIL # BLD AUTO: 0.2 K/UL (ref 0–0.5)
EOSINOPHIL NFR BLD: 1.2 % (ref 0–8)
ERYTHROCYTE [DISTWIDTH] IN BLOOD BY AUTOMATED COUNT: 14.8 % (ref 11.5–14.5)
EST. GFR  (AFRICAN AMERICAN): >60 ML/MIN/1.73 M^2
EST. GFR  (NON AFRICAN AMERICAN): >60 ML/MIN/1.73 M^2
GLUCOSE SERPL-MCNC: 89 MG/DL (ref 70–110)
HCT VFR BLD AUTO: 33.6 % (ref 40–54)
HGB BLD-MCNC: 10.6 G/DL (ref 14–18)
IMM GRANULOCYTES # BLD AUTO: 0.1 K/UL (ref 0–0.04)
IMM GRANULOCYTES NFR BLD AUTO: 0.7 % (ref 0–0.5)
LYMPHOCYTES # BLD AUTO: 0.7 K/UL (ref 1–4.8)
LYMPHOCYTES NFR BLD: 4.9 % (ref 18–48)
MAGNESIUM SERPL-MCNC: 1.8 MG/DL (ref 1.6–2.6)
MCH RBC QN AUTO: 27.8 PG (ref 27–31)
MCHC RBC AUTO-ENTMCNC: 31.5 G/DL (ref 32–36)
MCV RBC AUTO: 88 FL (ref 82–98)
MONOCYTES # BLD AUTO: 0.6 K/UL (ref 0.3–1)
MONOCYTES NFR BLD: 4.5 % (ref 4–15)
NEUTROPHILS # BLD AUTO: 12 K/UL (ref 1.8–7.7)
NEUTROPHILS NFR BLD: 88.4 % (ref 38–73)
NRBC BLD-RTO: 0 /100 WBC
PHOSPHATE SERPL-MCNC: 3.6 MG/DL (ref 2.7–4.5)
PLATELET # BLD AUTO: 432 K/UL (ref 150–450)
PMV BLD AUTO: 10.3 FL (ref 9.2–12.9)
POTASSIUM SERPL-SCNC: 4 MMOL/L (ref 3.5–5.1)
PROT SERPL-MCNC: 5.4 G/DL (ref 6–8.4)
RBC # BLD AUTO: 3.81 M/UL (ref 4.6–6.2)
SODIUM SERPL-SCNC: 132 MMOL/L (ref 136–145)
WBC # BLD AUTO: 13.61 K/UL (ref 3.9–12.7)

## 2022-03-17 PROCEDURE — 84100 ASSAY OF PHOSPHORUS: CPT

## 2022-03-17 PROCEDURE — 97166 OT EVAL MOD COMPLEX 45 MIN: CPT

## 2022-03-17 PROCEDURE — 63600175 PHARM REV CODE 636 W HCPCS

## 2022-03-17 PROCEDURE — 83735 ASSAY OF MAGNESIUM: CPT

## 2022-03-17 PROCEDURE — 97163 PT EVAL HIGH COMPLEX 45 MIN: CPT

## 2022-03-17 PROCEDURE — 97530 THERAPEUTIC ACTIVITIES: CPT

## 2022-03-17 PROCEDURE — 63600175 PHARM REV CODE 636 W HCPCS: Performed by: COLON & RECTAL SURGERY

## 2022-03-17 PROCEDURE — 80053 COMPREHEN METABOLIC PANEL: CPT

## 2022-03-17 PROCEDURE — 99900035 HC TECH TIME PER 15 MIN (STAT)

## 2022-03-17 PROCEDURE — 25000003 PHARM REV CODE 250

## 2022-03-17 PROCEDURE — 11000001 HC ACUTE MED/SURG PRIVATE ROOM

## 2022-03-17 PROCEDURE — 36415 COLL VENOUS BLD VENIPUNCTURE: CPT

## 2022-03-17 PROCEDURE — 94760 N-INVAS EAR/PLS OXIMETRY 1: CPT

## 2022-03-17 PROCEDURE — 25000003 PHARM REV CODE 250: Performed by: COLON & RECTAL SURGERY

## 2022-03-17 PROCEDURE — 85025 COMPLETE CBC W/AUTO DIFF WBC: CPT

## 2022-03-17 RX ORDER — OXYCODONE HYDROCHLORIDE 5 MG/1
10 TABLET ORAL EVERY 6 HOURS PRN
Status: DISCONTINUED | OUTPATIENT
Start: 2022-03-17 | End: 2022-03-18

## 2022-03-17 RX ORDER — OXYCODONE HYDROCHLORIDE 5 MG/1
5 TABLET ORAL EVERY 6 HOURS PRN
Status: DISCONTINUED | OUTPATIENT
Start: 2022-03-17 | End: 2022-03-18

## 2022-03-17 RX ORDER — TALC
6 POWDER (GRAM) TOPICAL NIGHTLY PRN
Status: DISCONTINUED | OUTPATIENT
Start: 2022-03-18 | End: 2022-03-23 | Stop reason: HOSPADM

## 2022-03-17 RX ORDER — CALCIUM CARBONATE 200(500)MG
500 TABLET,CHEWABLE ORAL DAILY PRN
Status: DISCONTINUED | OUTPATIENT
Start: 2022-03-18 | End: 2022-03-23 | Stop reason: HOSPADM

## 2022-03-17 RX ADMIN — PIPERACILLIN AND TAZOBACTAM 4.5 G: 4; .5 INJECTION, POWDER, LYOPHILIZED, FOR SOLUTION INTRAVENOUS; PARENTERAL at 05:03

## 2022-03-17 RX ADMIN — ACETAMINOPHEN 650 MG: 325 TABLET ORAL at 05:03

## 2022-03-17 RX ADMIN — SODIUM CHLORIDE, SODIUM LACTATE, POTASSIUM CHLORIDE, AND CALCIUM CHLORIDE: .6; .31; .03; .02 INJECTION, SOLUTION INTRAVENOUS at 08:03

## 2022-03-17 RX ADMIN — OXYCODONE HYDROCHLORIDE 10 MG: 5 TABLET ORAL at 09:03

## 2022-03-17 RX ADMIN — PIPERACILLIN AND TAZOBACTAM 4.5 G: 4; .5 INJECTION, POWDER, LYOPHILIZED, FOR SOLUTION INTRAVENOUS; PARENTERAL at 08:03

## 2022-03-17 RX ADMIN — ACETAMINOPHEN 650 MG: 325 TABLET ORAL at 11:03

## 2022-03-17 RX ADMIN — PIPERACILLIN AND TAZOBACTAM 4.5 G: 4; .5 INJECTION, POWDER, LYOPHILIZED, FOR SOLUTION INTRAVENOUS; PARENTERAL at 11:03

## 2022-03-17 RX ADMIN — CALCIUM CARBONATE (ANTACID) CHEW TAB 500 MG 500 MG: 500 CHEW TAB at 11:03

## 2022-03-17 RX ADMIN — Medication 6 MG: at 11:03

## 2022-03-17 RX ADMIN — CHLORHEXIDINE GLUCONATE 0.12% ORAL RINSE 10 ML: 1.2 LIQUID ORAL at 09:03

## 2022-03-17 RX ADMIN — CHLORHEXIDINE GLUCONATE 0.12% ORAL RINSE 10 ML: 1.2 LIQUID ORAL at 08:03

## 2022-03-17 NOTE — ASSESSMENT & PLAN NOTE
80yo M with R colon adenocarcinoma now s/p open RHC with end ileostomy on 3/15/22    - Advance to regular diet today, monitor for tolerance  - IVF  - PCA d/c today, transition to PRN PO pain control  - WCON c/s for stoma education, care and supplies  - PPx: LVNX  - IS 10xs/hr while awake  - OOB, ambulation encouraged. PT/OT on board.

## 2022-03-17 NOTE — PROGRESS NOTES
[facility] - Med Surg  Adult Nutrition  Progress Note    SUMMARY     Recommendations     1. Diet: regular ; texture: Soft and Bite Sized Foods (IDDSI Level 6)       (monitor tolerance and adjust texture as necessary)      (consider SLP consult if necessary)  2. Nutrition Supplements for optimal calorie and protein intake   - Boost Plus (any flavor) twice daily to provide an additional 720 calories and 28g protein, and 370ml fluid    and    - Boost Pudding (any flavor) twice daily to provide an additional 460 kcal, 14g protein, 64g CHO, and 186ml fluid   3. RD to follow up to monitor intake, tolerance, and labs.    *Please send consult if acute nutrition needs arise.    Goals:  Pt will tolerate >75% estimated energy and protein needs by RD follow up.    Nutrition Goal Status: new   Communication of RD recs: POC and sticky note    Assessment and Plan  Nutrition Problem:    Severe Protein-Calorie Malnutrition    in the context of Acute Illness/Injury  Related to (etiology):    Inadequate ability to consume sufficient energy    Inadequate oral intake    Decreased appetite    Increased energy and protein needs    Medical condition  Signs and Symptoms (as evidenced by):    Energy Intake: <75% of estimated energy requirement for  >7 days    Body Fat Depletion: mild, moderate and severe depletion of orbitals, triceps and thoracic and lumbar region     Muscle Mass Depletion: mild, moderate and severe depletion of temples, clavicle region, scapular region, interosseous muscle and lower extremities     Weight Loss: 40 lbs, >7.5% x ~ 3 months    Fluid Accumulation: not indicated  Interventions(treatment strategy):    Enteral nutrition     general, healthful texture modified diet    Nutrition supplement    Collaboration with other care providers  Nutrition Diagnosis Status:   New     Reason for Assessment  Reason for assessment: identified at risk by screening criteria   Diagnosis: Malignant neoplasm of ascending colon   Relevant  "medical history:   Past Medical History:   Diagnosis Date    Colon cancer 02/2022    cecal      General information comments:   03/17/2022: RD spoke with pt and 2 sons at bedside. Family reports pt with worsening intake PTA; ~ 40 lb wt loss since January (no weight hx per EMR for comparison); recently began to provide Boost daily and pureed foods which improved intake. Pt has received 2 meals since admit (B&L today) with <50% intake. Agreeable to soft and bite sized foods and Boost daily. I provided a menu, discussed meal options, and encouraged PO intake. Will continue to monitor.     Nutrition Discharge Planning - pending medical course, regular diet; texture as tolerated    Nutrition Risk Screen  Have you recently lost weight without trying?: Yes: 34 lbs or more  Have you been eating poorly because of a decreased appetite?: Yes  Nutrition Risk Screen: no indicators present    Nutrition/Diet History  Patient Reported Diet/Restrictions/Preferences: general   Typical Food/Fluid Intake: Boost/Ensure TID  Food Preferences: recently started on pureed foods to increase intake (agreeable to IDDSI 5 while IP)  Food Allergies: NKFA  Factors Affecting Nutritional Intake: impaired cognitive status/motor control, chewing difficulties and decreased appetite   Spiritual, Cultural Beliefs, Roman Catholic Practices, Values that Affect Care: no    Anthropometrics  Temp: 98 °F (36.7 °C)  Height Method: Stated  Height: 6' 6" (198.1 cm)  Height (inches): 78 in  Weight Method: Bed Scale  Weight: 71 kg (156 lb 8.4 oz)  Weight (lb): 156.53 lb  Ideal Body Weight (IBW), Male: 214 lb  % Ideal Body Weight, Male (lb): 64.39 %  BMI (Calculated): 18.1       Labs  Pertinent Labs: reviewed  Lab Results   Component Value Date    HGB 10.6 (L) 03/17/2022    HCT 33.6 (L) 03/17/2022     (L) 03/17/2022    CALCIUM 8.3 (L) 03/17/2022    K 4.0 03/17/2022    PHOS 3.6 03/17/2022    BUN 19 03/17/2022    CREATININE 0.6 03/17/2022    ESTGFRAFRICA >60 " 03/17/2022    EGFRNONAA >60 03/17/2022    ALBUMIN 2.1 (L) 03/17/2022     Meds  Pertinent Medications: reviewed    acetaminophen  650 mg Oral Q6H    chlorhexidine  10 mL Mouth/Throat BID    piperacillin-tazobactam (ZOSYN) IVPB  4.5 g Intravenous Q8H     Continuous Infusions:   lactated ringers Stopped (03/17/22 0534)     PRN Meds:albuterol sulfate, naloxone, ondansetron, oxyCODONE, oxyCODONE, sodium chloride 0.9%, sodium chloride 0.9%     Physical Findings/Assessment  Nausea/Vomiting Signs/Symptoms:  (no s&s)  Wounds: incision site noted  Edema: 3+ legs  Last Bowel Movement: 03/17/22 (via ostomy)      Gregorio Score: 19  Mouth/Teeth WDL: WDL except, teeth Teeth Symptoms: teeth absent  O2 Device (Oxygen Therapy): room air  NFPE performed indicating moderate to severe muscle and fat wasting    Malnutrition Assessment  Malnutrition Type: acute illness or injury  Energy Intake: moderate energy intake          Weight Loss (Malnutrition): greater than 7.5% in 3 months  Subcutaneous Fat (Malnutrition): severe depletion  Muscle Mass (Malnutrition): severe depletion               Subcutaneous Fat Loss (Final Summary): severe protein-calorie malnutrition  Muscle Loss Evaluation (Final Summary): severe protein-calorie malnutrition    Severe Weight Loss (Malnutrition): greater than 7.5% in 3 months          Estimated/Assessed Needs  Weight Used For Calorie Calculations: 71 kg (156 lb 8.4 oz)  Energy Calorie Requirements (kcal): 6068-6232 (25-35, malnourshed, underweight)  Energy Need Method: Kcal/kg  Weight Used For Protein Calculations: 71 kg (156 lb 8.4 oz)  Protein Requirements: 71-106g (malnourished, age)  RDA Method (mL): 1775ml fluid or per MD/NP    Nutrition Prescription Ordered  regular     Evaluation of Received Nutrients  Energy Calories Required: not meeting needs  Protein Required: not meeting needs  Tolerance: tolerating  % Intake of Estimated Energy Needs: 25 - 50 %  % Meal Intake: 0 - 25 %    Monitor and  Evaluation  Food and Nutrient Intake: food and beverage intake  Food and Nutrient Administration: diet order  Anthropometric Measurements: weight, weight change, body mass index  Biochemical Data, Medical Tests and Procedures: electrolyte and renal panel, lipid profile, gastrointestinal profile, glucose/endocrine profile, Inflammatory profile  Nutrition-Focused Physical Findings: overall appearance     Nutrition Follow-Up  Level of nutrition risk: high  Frequency of follow-up: Twice weekly   Tentative Next Date to be Seen by RD: 03/22/22  Jocelyne Lott, MS, RD, LDN

## 2022-03-17 NOTE — PLAN OF CARE
SEE EVAL FOR DETAILS. PT DISPLAYS DEFICITS WITH FUNCTIONAL MOBILITY/ TRANSFERS, DECREASE ADL'S SKILLS, DECREASE B UE STRENGTH/ENDURANCE. RECOMMEND HOME HEALTH OT WITH 24 HOUR CARE   Full range of motion of upper and lower extremities, no joint tenderness/swelling.

## 2022-03-17 NOTE — ASSESSMENT & PLAN NOTE
Nutrition Problem:    Severe Protein-Calorie Malnutrition    in the context of Acute Illness/Injury  Related to (etiology):    Inadequate ability to consume sufficient energy    Inadequate oral intake    Decreased appetite    Increased energy and protein needs    Medical condition  Signs and Symptoms (as evidenced by):    Energy Intake: <75% of estimated energy requirement for  >7 days    Body Fat Depletion: mild, moderate and severe depletion of orbitals, triceps and thoracic and lumbar region     Muscle Mass Depletion: mild, moderate and severe depletion of temples, clavicle region, scapular region, interosseous muscle and lower extremities     Weight Loss: 40 lbs, >7.5% x ~ 3 months    Fluid Accumulation: not indicated  Interventions(treatment strategy):    Enteral nutrition     general, healthful texture modified diet    Nutrition supplement    Collaboration with other care providers

## 2022-03-17 NOTE — PLAN OF CARE
Pt remains free from injury/falls this shift. Safety precautions maintained. Pain managed with PRN meds. Diet tolerated well. VSS. No signs and symptoms of acute distress noted at this time. Chart reviewed, will continue to monitor.

## 2022-03-17 NOTE — PT/OT/SLP EVAL
Physical Therapy Evaluation    Patient Name:  Jag Tipotn   MRN:  [id number]    Recommendations:     Discharge Recommendations:  home health PT (24 HOUR CAREGIVERS)   Discharge Equipment Recommendations: none   Barriers to discharge: None    Assessment:     Jag Tipton is a 81 y.o. male admitted with a medical diagnosis of Malignant neoplasm of ascending colon.  He presents with the following impairments/functional limitations:  weakness, impaired endurance, impaired self care skills, impaired functional mobilty, gait instability, impaired balance, pain, decreased safety awareness, decreased lower extremity function, decreased upper extremity function, decreased ROM, impaired coordination .    Rehab Prognosis: Fair; patient would benefit from acute skilled PT services to address these deficits and reach maximum level of function.    Recent Surgery: Procedure(s) (LRB):  COLECTOMY, RIGHT OPEN WITH END ILEOSTOMY CONSTRUCTION (N/A)  BIOPSY, LIVER (N/A)  BIOPSY, PERITONEUM (N/A)  BIOPSY, ABDOMINAL WALL (N/A)  BLOCK, TRANSVERSUS ABDOMINIS PLANE (N/A)  HEMICOLECTOMY, RIGHT (Right) 2 Days Post-Op    Plan:     During this hospitalization, patient to be seen 3 x/week to address the identified rehab impairments via gait training, therapeutic activities, therapeutic exercises and progress toward the following goals:    · Plan of Care Expires:  03/31/22    Subjective     Chief Complaint: PAIN   Patient/Family Comments/goals: NONE STATED   Pain/Comfort:  · Pain Rating 1: 4/10  · Location 1: abdomen  · Pain Rating Post-Intervention 1: 4/10    Patients cultural, spiritual, Mormonism conflicts given the current situation:      Living Environment:  PT LIVES AT HOME BEHIND SON IN A HOUSE WITH 4 STEPS AND RAMP TO ENTER   Prior to admission, patients level of function was VIRIDIANA WITH ROLLATOR X MAX OF 80' IN HOME HOWEVER MAINLY STAYS IN BED. .  Equipment used at home: wheelchair, walker, rolling, grab bar, rollator, cane, straight,  shower chair.  DME owned (not currently used): rolling walker.  Upon discharge, patient will have assistance from FAMILY .    Objective:     Communicated with NURSE WILSON AND [facility] CHART REVIEW  prior to session.  Patient found supine with peripheral IV, telemetry, KIRK drain  upon PT entry to room.    General Precautions: Standard, fall   Orthopedic Precautions:N/A   Braces: N/A  Respiratory Status: Room air    Exams:  · Cognitive Exam:  Patient is oriented to Person, Place, Time and Situation    Functional Mobility:  · Bed Mobility:     · Rolling Left:  minimum assistance  · Supine to Sit: minimum assistance  · Sit to Supine: minimum assistance  · Transfers:     · Sit to Stand:  minimum assistance with rolling walker  · Bed to Chair: minimum assistance with  rolling walker  using  Stand Pivot  · Gait: 20' WITH RW AND IN A WITH B FOOT DROP    Therapeutic Activities and Exercises:   PT EDUCATED ON ROLE OF P.T. AND PT AGREED TO TRY     AM-PAC 6 CLICK MOBILITY  Total Score:16     Patient left HOB elevated with call button in reach.    GOALS:   Multidisciplinary Problems     Physical Therapy Goals        Problem: Physical Therapy Goal    Goal Priority Disciplines Outcome Goal Variances Interventions   Physical Therapy Goal     PT, PT/OT      Description: LTG: 3/31/22  1. PT SUP>SIT EOB WITH SBA  2. PT WILL T/F TO CHAIR WITH RW AND CGA  3. PT WILL GT TRAIN X 80' WITH RW AND CGA                   History:     Past Medical History:   Diagnosis Date    Colon cancer 02/2022    cecal       Past Surgical History:   Procedure Laterality Date    BIOPSY OF ABDOMINAL WALL N/A 3/15/2022    Procedure: BIOPSY, ABDOMINAL WALL;  Surgeon: Michael Castro MD;  Location: [facility] OR;  Service: Colon and Rectal;  Laterality: N/A;    BIOPSY OF PERITONEUM N/A 3/15/2022    Procedure: BIOPSY, PERITONEUM;  Surgeon: Michael Castro MD;  Location: [facility] OR;  Service: Colon and Rectal;  Laterality: N/A;    COLECTOMY, RIGHT N/A 3/15/2022     Procedure: COLECTOMY, RIGHT OPEN WITH END ILEOSTOMY CONSTRUCTION;  Surgeon: Michael Castro MD;  Location: [facility] OR;  Service: Colon and Rectal;  Laterality: N/A;    INJECTION OF ANESTHETIC AGENT INTO TISSUE PLANE DEFINED BY TRANSVERSUS ABDOMINIS MUSCLE N/A 3/15/2022    Procedure: BLOCK, TRANSVERSUS ABDOMINIS PLANE;  Surgeon: Michael Castro MD;  Location: [facility] OR;  Service: Colon and Rectal;  Laterality: N/A;    LIVER BIOPSY N/A 3/15/2022    Procedure: BIOPSY, LIVER;  Surgeon: Michael Castro MD;  Location: [facility] OR;  Service: Colon and Rectal;  Laterality: N/A;    RIGHT HEMICOLECTOMY Right 3/15/2022    Procedure: HEMICOLECTOMY, RIGHT;  Surgeon: Michael Castro MD;  Location: [facility] OR;  Service: Colon and Rectal;  Laterality: Right;       Time Tracking:     PT Received On: 03/17/22  PT Start Time: 1035     PT Stop Time: 1055  PT Total Time (min): 20 min     Billable Minutes: Evaluation 20 03/17/2022

## 2022-03-17 NOTE — PROGRESS NOTES
[facility] Surg  Colorectal Surgery  Progress Note    Subjective:     History of Present Illness:  80yo M with R colon adenocarcinoma who presents for definitive surgical management      Post-Op Info:  Procedure(s) (LRB):  COLECTOMY, RIGHT OPEN WITH END ILEOSTOMY CONSTRUCTION (N/A)  BIOPSY, LIVER (N/A)  BIOPSY, PERITONEUM (N/A)  BIOPSY, ABDOMINAL WALL (N/A)  BLOCK, TRANSVERSUS ABDOMINIS PLANE (N/A)  HEMICOLECTOMY, RIGHT (Right)   2 Days Post-Op     Interval History: Pain well controlled. Having more significant ileostomy function. Denies nausea or vomiting. Minimal ambulation still. Reza removed and dressing changed today.    Medications:  Continuous Infusions:   lactated ringers Stopped (03/17/22 0534)     Scheduled Meds:   acetaminophen  650 mg Oral Q6H    chlorhexidine  10 mL Mouth/Throat BID    piperacillin-tazobactam (ZOSYN) IVPB  4.5 g Intravenous Q8H     PRN Meds:albuterol sulfate, naloxone, ondansetron, oxyCODONE, oxyCODONE, sodium chloride 0.9%, sodium chloride 0.9%     Review of patient's allergies indicates:  No Known Allergies  Objective:     Vital Signs (Most Recent):  Temp: 97.9 °F (36.6 °C) (03/17/22 0743)  Pulse: 75 (03/17/22 0743)  Resp: 18 (03/17/22 0743)  BP: (!) 129/55 (03/17/22 0743)  SpO2: (!) 93 % (03/17/22 0743)   Vital Signs (24h Range):  Temp:  [96.2 °F (35.7 °C)-97.9 °F (36.6 °C)] 97.9 °F (36.6 °C)  Pulse:  [] 75  Resp:  [17-23] 18  SpO2:  [87 %-100 %] 93 %  BP: ()/(49-63) 129/55     Weight: 71 kg (156 lb 8.4 oz)  Body mass index is 18.09 kg/m².    Intake/Output - Last 3 Shifts         03/15 0700  03/16 0659 03/16 0700 03/17 0659 03/17 0700 03/18 0659    I.V. (mL/kg) 696.6 (10.3) 2159.6 (30.4) 1 (0)    IV Piggyback 2000 226     Total Intake(mL/kg) 2696.6 (39.9) 2385.5 (33.6) 1 (0)    Urine (mL/kg/hr) 250 400 (0.2)     Drains 335 450     Stool 200 1400     Total Output 785 2250     Net +1911.6 +135.5 +1                   Physical Exam  Vitals and nursing note reviewed.    Constitutional:       General: He is not in acute distress.     Appearance: He is well-developed. He is not diaphoretic.   HENT:      Head: Normocephalic and atraumatic.      Right Ear: External ear normal.      Left Ear: External ear normal.   Eyes:      Extraocular Movements: Extraocular movements intact.      Conjunctiva/sclera: Conjunctivae normal.   Cardiovascular:      Rate and Rhythm: Normal rate.   Pulmonary:      Effort: Pulmonary effort is normal. No respiratory distress.   Abdominal:      General: There is no distension.      Palpations: Abdomen is soft.      Tenderness: There is abdominal tenderness.      Comments: Minimal TTP. Surgical dressing removed along with bolivar. Site is without signs of infection. KRIK with minimal serosanguinous output. Ileostomy with greatly increased output overnight.    Skin:     General: Skin is warm and dry.   Neurological:      Mental Status: He is alert and oriented to person, place, and time.   Psychiatric:      Comments: Confused on exam        Significant Labs:  I have reviewed all pertinent lab results within the past 24 hours.  CBC:   Recent Labs   Lab 03/16/22  0432   WBC 15.80*   RBC 4.09*   HGB 11.6*   HCT 36.6*   *   MCV 90   MCH 28.4   MCHC 31.7*     CMP:   Recent Labs   Lab 03/16/22  0432      CALCIUM 8.7   ALBUMIN 2.1*   PROT 5.3*      K 4.0   CO2 24      BUN 23   CREATININE 0.6   ALKPHOS 79   ALT 10   AST 15   BILITOT 0.5       Significant Diagnostics:  I have reviewed all pertinent imaging results/findings within the past 24 hours.  No new pertinent imaging.     Assessment/Plan:     * Malignant neoplasm of ascending colon  82yo M with R colon adenocarcinoma now s/p open RHC with end ileostomy on 3/15/22    - Advance to regular diet today, monitor for tolerance  - IVF  - PCA d/c today, transition to PRN PO pain control  - WCON c/s for stoma education, care and supplies  - PPx: LVNX  - IS 10xs/hr while awake  - OOB, ambulation  encouraged. PT/OT on board.        Em Hare PA-C  Colorectal Surgery  [facility]

## 2022-03-17 NOTE — PROGRESS NOTES
F/U visit with Mr. Tipton. He is awake and alert, c/o of incisional abdominal pain. 1 piece convex high output ostomy pouch intact to RUQ. Stoma moist and pink. Large amount thin watery bilious output in pouch. Son at bedside today. Reviewed prior ostomy education, printed material remains at bedside. Did not change pouch today due to c/o abdominal incisional pain and pouch intact - will plan to change pouch tomorrow. Demonstrated emptying drainable AMANDA pouch, and patient and son practiced on clean pouch. Questions answered.   Will follow.

## 2022-03-17 NOTE — SUBJECTIVE & OBJECTIVE
Interval History: Pain well controlled. Having more significant ileostomy function. Denies nausea or vomiting. Minimal ambulation still. Reza removed and dressing changed today.    Medications:  Continuous Infusions:   lactated ringers Stopped (03/17/22 0534)     Scheduled Meds:   acetaminophen  650 mg Oral Q6H    chlorhexidine  10 mL Mouth/Throat BID    piperacillin-tazobactam (ZOSYN) IVPB  4.5 g Intravenous Q8H     PRN Meds:albuterol sulfate, naloxone, ondansetron, oxyCODONE, oxyCODONE, sodium chloride 0.9%, sodium chloride 0.9%     Review of patient's allergies indicates:  No Known Allergies  Objective:     Vital Signs (Most Recent):  Temp: 97.9 °F (36.6 °C) (03/17/22 0743)  Pulse: 75 (03/17/22 0743)  Resp: 18 (03/17/22 0743)  BP: (!) 129/55 (03/17/22 0743)  SpO2: (!) 93 % (03/17/22 0743)   Vital Signs (24h Range):  Temp:  [96.2 °F (35.7 °C)-97.9 °F (36.6 °C)] 97.9 °F (36.6 °C)  Pulse:  [] 75  Resp:  [17-23] 18  SpO2:  [87 %-100 %] 93 %  BP: ()/(49-63) 129/55     Weight: 71 kg (156 lb 8.4 oz)  Body mass index is 18.09 kg/m².    Intake/Output - Last 3 Shifts         03/15 0700 03/16 0659 03/16 0700 03/17 0659 03/17 0700 03/18 0659    I.V. (mL/kg) 696.6 (10.3) 2159.6 (30.4) 1 (0)    IV Piggyback 2000 226     Total Intake(mL/kg) 2696.6 (39.9) 2385.5 (33.6) 1 (0)    Urine (mL/kg/hr) 250 400 (0.2)     Drains 335 450     Stool 200 1400     Total Output 785 2250     Net +1911.6 +135.5 +1                   Physical Exam  Vitals and nursing note reviewed.   Constitutional:       General: He is not in acute distress.     Appearance: He is well-developed. He is not diaphoretic.   HENT:      Head: Normocephalic and atraumatic.      Right Ear: External ear normal.      Left Ear: External ear normal.   Eyes:      Extraocular Movements: Extraocular movements intact.      Conjunctiva/sclera: Conjunctivae normal.   Cardiovascular:      Rate and Rhythm: Normal rate.   Pulmonary:      Effort: Pulmonary effort is  normal. No respiratory distress.   Abdominal:      General: There is no distension.      Palpations: Abdomen is soft.      Tenderness: There is abdominal tenderness.      Comments: Minimal TTP. Surgical dressing removed along with bolivar. Site is without signs of infection. KIRK with minimal serosanguinous output. Ileostomy with greatly increased output overnight.    Skin:     General: Skin is warm and dry.   Neurological:      Mental Status: He is alert and oriented to person, place, and time.   Psychiatric:      Comments: Confused on exam        Significant Labs:  I have reviewed all pertinent lab results within the past 24 hours.  CBC:   Recent Labs   Lab 03/16/22 0432   WBC 15.80*   RBC 4.09*   HGB 11.6*   HCT 36.6*   *   MCV 90   MCH 28.4   MCHC 31.7*     CMP:   Recent Labs   Lab 03/16/22 0432      CALCIUM 8.7   ALBUMIN 2.1*   PROT 5.3*      K 4.0   CO2 24      BUN 23   CREATININE 0.6   ALKPHOS 79   ALT 10   AST 15   BILITOT 0.5       Significant Diagnostics:  I have reviewed all pertinent imaging results/findings within the past 24 hours.  No new pertinent imaging.

## 2022-03-17 NOTE — PT/OT/SLP EVAL
Occupational Therapy   Evaluation    Name: Jag Tipton  MRN: [id number]  Admitting Diagnosis:  Malignant neoplasm of ascending colon  Recent Surgery: Procedure(s) (LRB):  COLECTOMY, RIGHT OPEN WITH END ILEOSTOMY CONSTRUCTION (N/A)  BIOPSY, LIVER (N/A)  BIOPSY, PERITONEUM (N/A)  BIOPSY, ABDOMINAL WALL (N/A)  BLOCK, TRANSVERSUS ABDOMINIS PLANE (N/A)  HEMICOLECTOMY, RIGHT (Right) 2 Days Post-Op    Recommendations:     Discharge Recommendations: home health OT (WITH 24 HOUR CARE)  Discharge Equipment Recommendations:  none  Barriers to discharge:       Assessment:     Jag Tipton is a 81 y.o. male with a medical diagnosis of Malignant neoplasm of ascending colon.  He presents with DEBILITY AND GENERALIZE WEAKNESS. Performance deficits affecting function: weakness, impaired balance, decreased safety awareness, impaired endurance, impaired self care skills, impaired functional mobilty, gait instability, decreased lower extremity function, decreased upper extremity function, decreased ROM.      Rehab Prognosis: Good; patient would benefit from acute skilled OT services to address these deficits and reach maximum level of function.       Plan:     Patient to be seen 2 x/week to address the above listed problems via self-care/home management, therapeutic activities, therapeutic exercises  · Plan of Care Expires:    · Plan of Care Reviewed with: patient    Subjective     Chief Complaint: DEBILITY AND GENERALIZED WEAKNESS  Patient/Family Comments/goals:     Occupational Profile:  Living Environment:  LIVES ALONE BEHIND HER SON   Previous level of function:   Roles and Routines: OCCUPATIONAL THERAPY  Equipment Used at Home:  walker, rolling, wheelchair, rollator, cane, straight  Assistance upon Discharge:     Pain/Comfort:  Pain Rating 1: 4/10  Location - Orientation 1: generalized  Location 1: abdomen  Pain Rating Post-Intervention 1: 4/10    Patients cultural, spiritual, Anabaptism conflicts given the current situation:       Objective:     Communicated with: NURSE AND Epic CHART REVIEW prior to session.  Patient found sitting edge of bed with peripheral IV, telemetry, KIRK drain upon OT entry to room.    General Precautions: Standard, fall   Orthopedic Precautions:N/A   Braces: N/A  Respiratory Status: Room air    Occupational Performance:    Functional Mobility/Transfers:  · Patient completed Sit <> Stand Transfer with moderate assistance  with  rolling walker     Activities of Daily Living:  · Lower Body Dressing: moderate assistance AUBREY/ DOFF SOCKS    Cognitive/Visual Perceptual:  Cognitive/Psychosocial Skills:     -       Oriented to: Person, Place, Time and Situation   -       Follows Commands/attention:Follows multistep  commands  -       Communication: clear/fluent  -       Memory: UNABLE TO TO ACCURATELY ASSESS  -       Safety awareness/insight to disability: impaired     Physical Exam:  Upper Extremity Range of Motion:     -       Right Upper Extremity: WFL  -       Left Upper Extremity: WFL  Upper Extremity Strength:    -       Right Upper Extremity: MMT: 3/5 GROSSLY  -       Left Upper Extremity: MMT: 3/5 GROSSLY   Strength:    -       Right Upper Extremity: MMT: 3/5 GROSSLY  -       Left Upper Extremity: MMT: 3/5 GROSSLY    AMPAC 6 Click ADL:  AMPAC Total Score: 16    Treatment & Education:  · O.T. EVAL COMPLETED. PT EDUCATED ON O.T. POC. PT  DISPLAYED DEFICITS WITH FUNCTIONAL MOBILITY/ TRANSFERS, DECREASE B UE STRENGTH/ENDURANCE AND DECREASE ADL'S SKILLS. SEE EVAL FOR DETAILS. Tx: : Functional Mobility:  MIN A WITH FUNCTIONAL MOBILITY X 20 FEET WITH ROLLING WALKER WITH MAX VC'S FOR HAND PLACEMENT    Education:    Patient left HOB elevated with all lines intact, call button in reach, NURSE notified and  SON present    GOALS:   Multidisciplinary Problems     Occupational Therapy Goals        Problem: Occupational Therapy Goal    Goal Priority Disciplines Outcome Interventions   Occupational Therapy Goal     OT, PT/OT      Description: OT GOALS TO BE MET BY 3-31-22  PT WILL TOLERATE 1 SET X 10 REPS B UE ROM EXERCISE  SBA WITH LE DRESSING  S WITH UE DRESSING  SBA WITH TOILET T/F'S                   History:     Past Medical History:   Diagnosis Date    Colon cancer 02/2022    cecal         Past Surgical History:   Procedure Laterality Date    BIOPSY OF ABDOMINAL WALL N/A 3/15/2022    Procedure: BIOPSY, ABDOMINAL WALL;  Surgeon: Michael Castro MD;  Location: [facility] OR;  Service: Colon and Rectal;  Laterality: N/A;    BIOPSY OF PERITONEUM N/A 3/15/2022    Procedure: BIOPSY, PERITONEUM;  Surgeon: Michael Castro MD;  Location: [facility] OR;  Service: Colon and Rectal;  Laterality: N/A;    COLECTOMY, RIGHT N/A 3/15/2022    Procedure: COLECTOMY, RIGHT OPEN WITH END ILEOSTOMY CONSTRUCTION;  Surgeon: Michael Castro MD;  Location: [facility] OR;  Service: Colon and Rectal;  Laterality: N/A;    INJECTION OF ANESTHETIC AGENT INTO TISSUE PLANE DEFINED BY TRANSVERSUS ABDOMINIS MUSCLE N/A 3/15/2022    Procedure: BLOCK, TRANSVERSUS ABDOMINIS PLANE;  Surgeon: Michael Castro MD;  Location: [facility] OR;  Service: Colon and Rectal;  Laterality: N/A;    LIVER BIOPSY N/A 3/15/2022    Procedure: BIOPSY, LIVER;  Surgeon: Michael Castro MD;  Location: [facility] OR;  Service: Colon and Rectal;  Laterality: N/A;    RIGHT HEMICOLECTOMY Right 3/15/2022    Procedure: HEMICOLECTOMY, RIGHT;  Surgeon: Michael Castro MD;  Location: [facility] OR;  Service: Colon and Rectal;  Laterality: Right;       Time Tracking:     OT Date of Treatment: 03/17/22  OT Start Time: 1040  OT Stop Time: 1103  OT Total Time (min): 23 min    Billable Minutes:Evaluation 13 MINUTES  Therapeutic Activity  10 MINUTES    3/17/2022

## 2022-03-18 LAB
ANION GAP SERPL CALC-SCNC: 7 MMOL/L (ref 8–16)
BASOPHILS # BLD AUTO: 0.05 K/UL (ref 0–0.2)
BASOPHILS NFR BLD: 0.3 % (ref 0–1.9)
BUN SERPL-MCNC: 14 MG/DL (ref 8–23)
CALCIUM SERPL-MCNC: 8.5 MG/DL (ref 8.7–10.5)
CHLORIDE SERPL-SCNC: 99 MMOL/L (ref 95–110)
CO2 SERPL-SCNC: 27 MMOL/L (ref 23–29)
CREAT SERPL-MCNC: 0.6 MG/DL (ref 0.5–1.4)
DIFFERENTIAL METHOD: ABNORMAL
EOSINOPHIL # BLD AUTO: 0.2 K/UL (ref 0–0.5)
EOSINOPHIL NFR BLD: 1.3 % (ref 0–8)
ERYTHROCYTE [DISTWIDTH] IN BLOOD BY AUTOMATED COUNT: 14.5 % (ref 11.5–14.5)
EST. GFR  (AFRICAN AMERICAN): >60 ML/MIN/1.73 M^2
EST. GFR  (NON AFRICAN AMERICAN): >60 ML/MIN/1.73 M^2
GLUCOSE SERPL-MCNC: 105 MG/DL (ref 70–110)
HCT VFR BLD AUTO: 33.6 % (ref 40–54)
HGB BLD-MCNC: 10.7 G/DL (ref 14–18)
IMM GRANULOCYTES # BLD AUTO: 0.12 K/UL (ref 0–0.04)
IMM GRANULOCYTES NFR BLD AUTO: 0.7 % (ref 0–0.5)
LYMPHOCYTES # BLD AUTO: 0.9 K/UL (ref 1–4.8)
LYMPHOCYTES NFR BLD: 5.2 % (ref 18–48)
MCH RBC QN AUTO: 27.5 PG (ref 27–31)
MCHC RBC AUTO-ENTMCNC: 31.8 G/DL (ref 32–36)
MCV RBC AUTO: 86 FL (ref 82–98)
MONOCYTES # BLD AUTO: 0.7 K/UL (ref 0.3–1)
MONOCYTES NFR BLD: 4.4 % (ref 4–15)
NEUTROPHILS # BLD AUTO: 14.4 K/UL (ref 1.8–7.7)
NEUTROPHILS NFR BLD: 88.1 % (ref 38–73)
NRBC BLD-RTO: 0 /100 WBC
PLATELET # BLD AUTO: 521 K/UL (ref 150–450)
PMV BLD AUTO: 10.1 FL (ref 9.2–12.9)
POTASSIUM SERPL-SCNC: 3.5 MMOL/L (ref 3.5–5.1)
RBC # BLD AUTO: 3.89 M/UL (ref 4.6–6.2)
SODIUM SERPL-SCNC: 133 MMOL/L (ref 136–145)
WBC # BLD AUTO: 16.33 K/UL (ref 3.9–12.7)

## 2022-03-18 PROCEDURE — 36415 COLL VENOUS BLD VENIPUNCTURE: CPT | Performed by: COLON & RECTAL SURGERY

## 2022-03-18 PROCEDURE — 99024 PR POST-OP FOLLOW-UP VISIT: ICD-10-PCS | Mod: POP,,, | Performed by: COLON & RECTAL SURGERY

## 2022-03-18 PROCEDURE — 99024 POSTOP FOLLOW-UP VISIT: CPT | Mod: POP,,, | Performed by: COLON & RECTAL SURGERY

## 2022-03-18 PROCEDURE — 99900035 HC TECH TIME PER 15 MIN (STAT)

## 2022-03-18 PROCEDURE — 11000001 HC ACUTE MED/SURG PRIVATE ROOM

## 2022-03-18 PROCEDURE — 85025 COMPLETE CBC W/AUTO DIFF WBC: CPT | Performed by: COLON & RECTAL SURGERY

## 2022-03-18 PROCEDURE — 80048 BASIC METABOLIC PNL TOTAL CA: CPT | Performed by: COLON & RECTAL SURGERY

## 2022-03-18 PROCEDURE — 25000003 PHARM REV CODE 250: Performed by: SURGERY

## 2022-03-18 PROCEDURE — 97110 THERAPEUTIC EXERCISES: CPT | Mod: CQ

## 2022-03-18 PROCEDURE — 97530 THERAPEUTIC ACTIVITIES: CPT | Mod: CQ

## 2022-03-18 PROCEDURE — 25000003 PHARM REV CODE 250

## 2022-03-18 PROCEDURE — 63600175 PHARM REV CODE 636 W HCPCS: Performed by: COLON & RECTAL SURGERY

## 2022-03-18 PROCEDURE — 94799 UNLISTED PULMONARY SVC/PX: CPT

## 2022-03-18 PROCEDURE — 25000003 PHARM REV CODE 250: Performed by: COLON & RECTAL SURGERY

## 2022-03-18 RX ORDER — AMOXICILLIN AND CLAVULANATE POTASSIUM 875; 125 MG/1; MG/1
1 TABLET, FILM COATED ORAL EVERY 12 HOURS
Status: COMPLETED | OUTPATIENT
Start: 2022-03-18 | End: 2022-03-23

## 2022-03-18 RX ORDER — LOPERAMIDE HYDROCHLORIDE 2 MG/1
2 CAPSULE ORAL 3 TIMES DAILY
Status: DISCONTINUED | OUTPATIENT
Start: 2022-03-18 | End: 2022-03-21

## 2022-03-18 RX ADMIN — Medication 6 MG: at 10:03

## 2022-03-18 RX ADMIN — ACETAMINOPHEN 650 MG: 325 TABLET ORAL at 01:03

## 2022-03-18 RX ADMIN — ACETAMINOPHEN 650 MG: 325 TABLET ORAL at 05:03

## 2022-03-18 RX ADMIN — CHLORHEXIDINE GLUCONATE 0.12% ORAL RINSE 10 ML: 1.2 LIQUID ORAL at 08:03

## 2022-03-18 RX ADMIN — ACETAMINOPHEN 650 MG: 325 TABLET ORAL at 06:03

## 2022-03-18 RX ADMIN — LOPERAMIDE HYDROCHLORIDE 2 MG: 2 CAPSULE ORAL at 10:03

## 2022-03-18 RX ADMIN — PIPERACILLIN AND TAZOBACTAM 4.5 G: 4; .5 INJECTION, POWDER, LYOPHILIZED, FOR SOLUTION INTRAVENOUS; PARENTERAL at 05:03

## 2022-03-18 RX ADMIN — AMOXICILLIN AND CLAVULANATE POTASSIUM 1 TABLET: 875; 125 TABLET, FILM COATED ORAL at 10:03

## 2022-03-18 RX ADMIN — CHLORHEXIDINE GLUCONATE 0.12% ORAL RINSE 10 ML: 1.2 LIQUID ORAL at 10:03

## 2022-03-18 RX ADMIN — LOPERAMIDE HYDROCHLORIDE 2 MG: 2 CAPSULE ORAL at 03:03

## 2022-03-18 RX ADMIN — CALCIUM CARBONATE (ANTACID) CHEW TAB 500 MG 500 MG: 500 CHEW TAB at 06:03

## 2022-03-18 RX ADMIN — PIPERACILLIN AND TAZOBACTAM 4.5 G: 4; .5 INJECTION, POWDER, LYOPHILIZED, FOR SOLUTION INTRAVENOUS; PARENTERAL at 01:03

## 2022-03-18 NOTE — SUBJECTIVE & OBJECTIVE
Interval History: Pain well controlled. Having high ileostomy output (>1L per day). No nausea or vomiting. Tolerating diet well.    Medications:  Continuous Infusions:  Scheduled Meds:   acetaminophen  650 mg Oral Q6H    amoxicillin-clavulanate 875-125mg  1 tablet Oral Q12H    chlorhexidine  10 mL Mouth/Throat BID    loperamide  2 mg Oral TID     PRN Meds:albuterol sulfate, calcium carbonate, melatonin, naloxone, ondansetron, oxyCODONE, oxyCODONE, sodium chloride 0.9%, sodium chloride 0.9%     Review of patient's allergies indicates:  No Known Allergies  Objective:     Vital Signs (Most Recent):  Temp: 98.2 °F (36.8 °C) (03/18/22 1112)  Pulse: 78 (03/18/22 1112)  Resp: 18 (03/18/22 1112)  BP: (!) 119/58 (03/18/22 1112)  SpO2: 97 % (03/18/22 1112)   Vital Signs (24h Range):  Temp:  [97.7 °F (36.5 °C)-98.5 °F (36.9 °C)] 98.2 °F (36.8 °C)  Pulse:  [] 78  Resp:  [16-18] 18  SpO2:  [96 %-97 %] 97 %  BP: (114-144)/(55-71) 119/58     Weight: 66.8 kg (147 lb 4.3 oz)  Body mass index is 17.02 kg/m².    Intake/Output - Last 3 Shifts         03/16 0700  03/17 0659 03/17 0700  03/18 0659 03/18 0700  03/19 0659    P.O.  120 240    I.V. (mL/kg) 2159.6 (30.4) 865.2 (13)     IV Piggyback 226 300     Total Intake(mL/kg) 2385.5 (33.6) 1285.3 (19.2) 240 (3.6)    Urine (mL/kg/hr) 400 (0.2) 950 (0.6) 0 (0)    Drains 450 150 130    Stool 1400 1000 345    Total Output 2250 2100 475    Net +135.5 -814.7 -235           Urine Occurrence   1 x            Physical Exam  Constitutional:       Appearance: He is well-developed.   HENT:      Head: Normocephalic and atraumatic.   Eyes:      Conjunctiva/sclera: Conjunctivae normal.   Neck:      Thyroid: No thyromegaly.   Cardiovascular:      Rate and Rhythm: Normal rate and regular rhythm.   Pulmonary:      Effort: Pulmonary effort is normal. No respiratory distress.   Abdominal:      Comments: Soft, nondistended, appropriately TTP; incision c/d/I without erythema or drainage; ostomy pink and  viable with liquid bilious stool in bag; KIRK with serous output in bulb   Musculoskeletal:         General: No tenderness. Normal range of motion.      Cervical back: Normal range of motion.   Skin:     General: Skin is warm and dry.      Capillary Refill: Capillary refill takes less than 2 seconds.      Findings: No rash.   Neurological:      Mental Status: He is alert and oriented to person, place, and time.       Significant Labs:  I have reviewed all pertinent lab results within the past 24 hours.  CBC:   Recent Labs   Lab 03/18/22  1238   WBC 16.33*   RBC 3.89*   HGB 10.7*   HCT 33.6*   *   MCV 86   MCH 27.5   MCHC 31.8*     BMP:   Recent Labs   Lab 03/17/22  0911 03/18/22  1238   GLU 89 105   * 133*   K 4.0 3.5    99   CO2 27 27   BUN 19 14   CREATININE 0.6 0.6   CALCIUM 8.3* 8.5*   MG 1.8  --

## 2022-03-18 NOTE — PROGRESS NOTES
[facility] Surg  Colorectal Surgery  Progress Note    Subjective:     History of Present Illness:  80yo M with R colon adenocarcinoma who presents for definitive surgical management      Post-Op Info:  Procedure(s) (LRB):  COLECTOMY, RIGHT OPEN WITH END ILEOSTOMY CONSTRUCTION (N/A)  BIOPSY, LIVER (N/A)  BIOPSY, PERITONEUM (N/A)  BIOPSY, ABDOMINAL WALL (N/A)  BLOCK, TRANSVERSUS ABDOMINIS PLANE (N/A)  HEMICOLECTOMY, RIGHT (Right)   3 Days Post-Op     Interval History: Pain well controlled. Having high ileostomy output (>1L per day). No nausea or vomiting. Tolerating diet well.    Medications:  Continuous Infusions:  Scheduled Meds:   acetaminophen  650 mg Oral Q6H    amoxicillin-clavulanate 875-125mg  1 tablet Oral Q12H    chlorhexidine  10 mL Mouth/Throat BID    loperamide  2 mg Oral TID     PRN Meds:albuterol sulfate, calcium carbonate, melatonin, naloxone, ondansetron, oxyCODONE, oxyCODONE, sodium chloride 0.9%, sodium chloride 0.9%     Review of patient's allergies indicates:  No Known Allergies  Objective:     Vital Signs (Most Recent):  Temp: 98.2 °F (36.8 °C) (03/18/22 1112)  Pulse: 78 (03/18/22 1112)  Resp: 18 (03/18/22 1112)  BP: (!) 119/58 (03/18/22 1112)  SpO2: 97 % (03/18/22 1112)   Vital Signs (24h Range):  Temp:  [97.7 °F (36.5 °C)-98.5 °F (36.9 °C)] 98.2 °F (36.8 °C)  Pulse:  [] 78  Resp:  [16-18] 18  SpO2:  [96 %-97 %] 97 %  BP: (114-144)/(55-71) 119/58     Weight: 66.8 kg (147 lb 4.3 oz)  Body mass index is 17.02 kg/m².    Intake/Output - Last 3 Shifts         03/16 0700  03/17 0659 03/17 0700  03/18 0659 03/18 0700  03/19 0659    P.O.  120 240    I.V. (mL/kg) 2159.6 (30.4) 865.2 (13)     IV Piggyback 226 300     Total Intake(mL/kg) 2385.5 (33.6) 1285.3 (19.2) 240 (3.6)    Urine (mL/kg/hr) 400 (0.2) 950 (0.6) 0 (0)    Drains 450 150 130    Stool 1400 1000 345    Total Output 2250 2100 475    Net +135.5 -814.7 -235           Urine Occurrence   1 x            Physical Exam  Constitutional:        Appearance: He is well-developed.   HENT:      Head: Normocephalic and atraumatic.   Eyes:      Conjunctiva/sclera: Conjunctivae normal.   Neck:      Thyroid: No thyromegaly.   Cardiovascular:      Rate and Rhythm: Normal rate and regular rhythm.   Pulmonary:      Effort: Pulmonary effort is normal. No respiratory distress.   Abdominal:      Comments: Soft, nondistended, appropriately TTP; incision c/d/I without erythema or drainage; ostomy pink and viable with liquid bilious stool in bag; KIRK with serous output in bulb   Musculoskeletal:         General: No tenderness. Normal range of motion.      Cervical back: Normal range of motion.   Skin:     General: Skin is warm and dry.      Capillary Refill: Capillary refill takes less than 2 seconds.      Findings: No rash.   Neurological:      Mental Status: He is alert and oriented to person, place, and time.       Significant Labs:  I have reviewed all pertinent lab results within the past 24 hours.  CBC:   Recent Labs   Lab 03/18/22  1238   WBC 16.33*   RBC 3.89*   HGB 10.7*   HCT 33.6*   *   MCV 86   MCH 27.5   MCHC 31.8*     BMP:   Recent Labs   Lab 03/17/22  0911 03/18/22  1238   GLU 89 105   * 133*   K 4.0 3.5    99   CO2 27 27   BUN 19 14   CREATININE 0.6 0.6   CALCIUM 8.3* 8.5*   MG 1.8  --      Assessment/Plan:     * Malignant neoplasm of ascending colon  82yo M with R colon adenocarcinoma now s/p open RHC with end ileostomy on 3/15/22    - Reg diet  - HLIV  - PO augmentin given stool spillage in OR and postop leukocytosis  - monitor ostomy output given >1L output yesterday  - PO pain control  - WCON c/s for stoma education, care and supplies  - PPx: LVNX  - IS 10xs/hr while awake  - OOB, ambulation encouraged. PT/OT on board.  - will eventually DC home on hospice (was on it preop, so will resume)        Michael Castro MD  Colorectal Surgery  [facility]

## 2022-03-18 NOTE — PT/OT/SLP PROGRESS
Physical Therapy  Treatment    Jag Tipton   MRN: [id number]   Admitting Diagnosis: Malignant neoplasm of ascending colon    PT Received On: 03/18/22  PT Start Time: 0900     PT Stop Time: 0930    PT Total Time (min): 30 min       Billable Minutes:  Therapeutic Activity 30    Treatment Type: Treatment  PT/PTA: PTA     PTA Visit Number: 1       General Precautions: Standard, fall  Orthopedic Precautions: N/A   Braces: N/A  Respiratory Status: Room air         Subjective:  Communicated with nurse, Sue, and completed Epic chart review prior to session.  Patient agreed to PT session.     Pain/Comfort  Pain Rating 1: 7/10 (INCISION)  Pain Addressed 1: Reposition, Distraction, Cessation of Activity  Pain Rating Post-Intervention 1: 7/10    Objective:   Patient found with: colostomy, KIRK drain, peripheral IV    Functional Mobility:  Roll R/L to alma brief: Min A     Supine > sit EOB: Min A    STS from EOB > RW x2 trials: Mod A  (VC for hand placement)    MIP x5 reps/LE with BUE support on RW: Min A    Stand pivot T/F to chair w/ RW: Mod A    Educated patient on TE to be performed throughout the day to maintain current level of ROM and strength to BLE. Exercises included: LAQ, Hip Flexion, AP. Patient successfully demonstrated x10 reps of each exercise with correct form. Re enforced importance of utilizing call light and not attempt to get up without staff assistance due to increased fall risk. Encouraged patient to increase OOB tolerance by remaining up in chair for 2 hour minimum, especially with meals. Patient verbalized understanding.       AM-PAC 6 CLICK MOBILITY  How much help from another person does this patient currently need?   1 = Unable, Total/Dependent Assistance  2 = A lot, Maximum/Moderate Assistance  3 = A little, Minimum/Contact Guard/Supervision  4 = None, Modified [place]/Independent    Turning over in bed (including adjusting bedclothes, sheets and blankets)?: 3  Sitting down on and standing up  from a chair with arms (e.g., wheelchair, bedside commode, etc.): 2  Moving from lying on back to sitting on the side of the bed?: 3  Moving to and from a bed to a chair (including a wheelchair)?: 2  Need to walk in hospital room?: 1  Climbing 3-5 steps with a railing?: 1  Basic Mobility Total Score: 12    AM-PAC Raw Score CMS G-Code Modifier Level of Impairment Assistance   6 % Total / Unable   7 - 9 CM 80 - 100% Maximal Assist   10 - 14 CL 60 - 80% Moderate Assist   15 - 19 CK 40 - 60% Moderate Assist   20 - 22 CJ 20 - 40% Minimal Assist   23 CI 1-20% SBA / CGA   24 CH 0% Independent/ Mod I     Patient left up in chair with all lines intact, call button in reach and son present.    Assessment:  Jag Tipton is a 81 y.o. male with a medical diagnosis of Malignant neoplasm of ascending colon and presents with overall decline in functional mobility. Patient would continue to benefit from skilled PT to address functional limitations listed below in order to return to PLOF/decrease caregiver burden.    Rehab identified problem list/impairments: Rehab identified problem list/impairments: weakness, impaired endurance, impaired self care skills, impaired functional mobilty, gait instability, impaired balance, impaired cognition, decreased coordination, decreased upper extremity function, decreased lower extremity function, decreased safety awareness, pain, decreased ROM, impaired skin, impaired cardiopulmonary response to activity    Rehab potential is fair.    Activity tolerance: Poor    Discharge recommendations: Discharge Facility/Level of Care Needs: home with home health (WITH 24 HOUR CARE)     Barriers to discharge:      Equipment recommendations: Equipment Needed After Discharge: none     GOALS:   Multidisciplinary Problems     Physical Therapy Goals        Problem: Physical Therapy Goal    Goal Priority Disciplines Outcome Goal Variances Interventions   Physical Therapy Goal     PT, PT/OT       Description: LTG: 3/31/22  1. PT SUP>SIT EOB WITH SBA  2. PT WILL T/F TO CHAIR WITH RW AND CGA  3. PT WILL GT TRAIN X 80' WITH RW AND CGA                   PLAN:    Patient to be seen 3 x/week  to address the above listed problems via gait training, therapeutic activities, therapeutic exercises  Plan of Care expires: 03/31/22  Plan of Care reviewed with: patient         03/18/2022

## 2022-03-18 NOTE — ASSESSMENT & PLAN NOTE
80yo M with R colon adenocarcinoma now s/p open RHC with end ileostomy on 3/15/22    - Reg diet  - HLIV  - PO augmentin given stool spillage in OR and postop leukocytosis  - monitor ostomy output given >1L output yesterday  - PO pain control  - WCON c/s for stoma education, care and supplies  - PPx: LVNX  - IS 10xs/hr while awake  - OOB, ambulation encouraged. PT/OT on board.  - will eventually DC home on hospice (was on it preop, so will resume)

## 2022-03-19 LAB
ANION GAP SERPL CALC-SCNC: 8 MMOL/L (ref 8–16)
BASOPHILS # BLD AUTO: 0.05 K/UL (ref 0–0.2)
BASOPHILS NFR BLD: 0.3 % (ref 0–1.9)
BUN SERPL-MCNC: 12 MG/DL (ref 8–23)
CALCIUM SERPL-MCNC: 8.4 MG/DL (ref 8.7–10.5)
CHLORIDE SERPL-SCNC: 98 MMOL/L (ref 95–110)
CO2 SERPL-SCNC: 27 MMOL/L (ref 23–29)
CREAT SERPL-MCNC: 0.6 MG/DL (ref 0.5–1.4)
DIFFERENTIAL METHOD: ABNORMAL
EOSINOPHIL # BLD AUTO: 0.4 K/UL (ref 0–0.5)
EOSINOPHIL NFR BLD: 2.3 % (ref 0–8)
ERYTHROCYTE [DISTWIDTH] IN BLOOD BY AUTOMATED COUNT: 14.5 % (ref 11.5–14.5)
EST. GFR  (AFRICAN AMERICAN): >60 ML/MIN/1.73 M^2
EST. GFR  (NON AFRICAN AMERICAN): >60 ML/MIN/1.73 M^2
GLUCOSE SERPL-MCNC: 105 MG/DL (ref 70–110)
HCT VFR BLD AUTO: 33 % (ref 40–54)
HGB BLD-MCNC: 10.6 G/DL (ref 14–18)
IMM GRANULOCYTES # BLD AUTO: 0.16 K/UL (ref 0–0.04)
IMM GRANULOCYTES NFR BLD AUTO: 1 % (ref 0–0.5)
LYMPHOCYTES # BLD AUTO: 0.9 K/UL (ref 1–4.8)
LYMPHOCYTES NFR BLD: 5.9 % (ref 18–48)
MCH RBC QN AUTO: 27.7 PG (ref 27–31)
MCHC RBC AUTO-ENTMCNC: 32.1 G/DL (ref 32–36)
MCV RBC AUTO: 86 FL (ref 82–98)
MONOCYTES # BLD AUTO: 0.8 K/UL (ref 0.3–1)
MONOCYTES NFR BLD: 5.1 % (ref 4–15)
NEUTROPHILS # BLD AUTO: 13.3 K/UL (ref 1.8–7.7)
NEUTROPHILS NFR BLD: 85.4 % (ref 38–73)
NRBC BLD-RTO: 0 /100 WBC
PLATELET # BLD AUTO: 583 K/UL (ref 150–450)
PMV BLD AUTO: 10.1 FL (ref 9.2–12.9)
POTASSIUM SERPL-SCNC: 3.4 MMOL/L (ref 3.5–5.1)
RBC # BLD AUTO: 3.83 M/UL (ref 4.6–6.2)
SODIUM SERPL-SCNC: 133 MMOL/L (ref 136–145)
WBC # BLD AUTO: 15.55 K/UL (ref 3.9–12.7)

## 2022-03-19 PROCEDURE — 80048 BASIC METABOLIC PNL TOTAL CA: CPT | Performed by: COLON & RECTAL SURGERY

## 2022-03-19 PROCEDURE — 97530 THERAPEUTIC ACTIVITIES: CPT | Mod: CQ

## 2022-03-19 PROCEDURE — 36415 COLL VENOUS BLD VENIPUNCTURE: CPT | Performed by: COLON & RECTAL SURGERY

## 2022-03-19 PROCEDURE — 94761 N-INVAS EAR/PLS OXIMETRY MLT: CPT

## 2022-03-19 PROCEDURE — 97116 GAIT TRAINING THERAPY: CPT | Mod: CQ

## 2022-03-19 PROCEDURE — 11000001 HC ACUTE MED/SURG PRIVATE ROOM

## 2022-03-19 PROCEDURE — 25000003 PHARM REV CODE 250: Performed by: SURGERY

## 2022-03-19 PROCEDURE — 25000003 PHARM REV CODE 250: Performed by: COLON & RECTAL SURGERY

## 2022-03-19 PROCEDURE — 25000003 PHARM REV CODE 250

## 2022-03-19 PROCEDURE — 85025 COMPLETE CBC W/AUTO DIFF WBC: CPT | Performed by: COLON & RECTAL SURGERY

## 2022-03-19 RX ORDER — POTASSIUM CHLORIDE 20 MEQ/1
20 TABLET, EXTENDED RELEASE ORAL ONCE
Status: COMPLETED | OUTPATIENT
Start: 2022-03-19 | End: 2022-03-19

## 2022-03-19 RX ADMIN — ACETAMINOPHEN 650 MG: 325 TABLET ORAL at 05:03

## 2022-03-19 RX ADMIN — LOPERAMIDE HYDROCHLORIDE 2 MG: 2 CAPSULE ORAL at 09:03

## 2022-03-19 RX ADMIN — ACETAMINOPHEN 650 MG: 325 TABLET ORAL at 12:03

## 2022-03-19 RX ADMIN — LOPERAMIDE HYDROCHLORIDE 2 MG: 2 CAPSULE ORAL at 04:03

## 2022-03-19 RX ADMIN — CHLORHEXIDINE GLUCONATE 0.12% ORAL RINSE 10 ML: 1.2 LIQUID ORAL at 10:03

## 2022-03-19 RX ADMIN — POTASSIUM CHLORIDE 20 MEQ: 1500 TABLET, EXTENDED RELEASE ORAL at 05:03

## 2022-03-19 RX ADMIN — AMOXICILLIN AND CLAVULANATE POTASSIUM 1 TABLET: 875; 125 TABLET, FILM COATED ORAL at 09:03

## 2022-03-19 RX ADMIN — LOPERAMIDE HYDROCHLORIDE 2 MG: 2 CAPSULE ORAL at 10:03

## 2022-03-19 RX ADMIN — CHLORHEXIDINE GLUCONATE 0.12% ORAL RINSE 10 ML: 1.2 LIQUID ORAL at 09:03

## 2022-03-19 RX ADMIN — AMOXICILLIN AND CLAVULANATE POTASSIUM 1 TABLET: 875; 125 TABLET, FILM COATED ORAL at 10:03

## 2022-03-19 NOTE — PT/OT/SLP PROGRESS
Physical Therapy      Patient Name:  Jag Tipton   MRN:  [id number]    Time: 4890-8609    Communicated with patient's nurse, Sue , and completed Epic chart review prior to tx session.    S: Patient agreed to PT session.    O: supine > sit EOB: SBA    STS from EOB > RW x2 trials total: Mod A (VC for hand placement)    40ft w/ RW Min A     Stand pivot T/F to EOB & chair w/ RW: Min A (VC for safe RW mgmt)    Educated patient on performing LE TE throughout the day outside of PT sessions to maintain current level of ROM and prevent further decline. Pt performed x10 reps of TE including: LAQ and Hip Flex. Patient expressed verbal understanding not to attempt to get up without staff assistance and to use call light to meet needs in room. Encouraged patient to increase time OOB and to remain OOB for a minimum of 2 hours/day and slowly increase as tolerated. patient verbalized understanding of all education given and agreed to comply.    Patient left sitting up in chair with call light in reach and daughter present.     A: Patient would continue to benefit from skilled PT to address functional limitations in order to return to PLOF/decrease caregiver burden.    P: Cont PT POC and current D/C recommendations    Charge: 1G 1TA    Maricruz Leon PTA

## 2022-03-19 NOTE — CONSULTS
Spoke with Lula with [facility] re: HB and sending referral to resume care. She will reach out to agency and call back with details on how to send.

## 2022-03-19 NOTE — NURSING
Patient is AAX04, with family member at bedside. Pt. Has a urostomy, drained at midnight. Pt. Also has ileostomy to the abdomin. Stool is dark green, liquid stool. Pt. Is tolerating dysphagia diet well. Pt. Was free from fall or injury. Pain managed appropriately. Pt. shows no signs of distress. Will continue to monitor. 24 hour chart check complete.

## 2022-03-19 NOTE — PROGRESS NOTES
[facility] Surg  General Surgery  Progress Note    Subjective:     History of Present Illness:  82yo M with R colon adenocarcinoma who presents for definitive surgical management      Post-Op Info:  Procedure(s) (LRB):  COLECTOMY, RIGHT OPEN WITH END ILEOSTOMY CONSTRUCTION (N/A)  BIOPSY, LIVER (N/A)  BIOPSY, PERITONEUM (N/A)  BIOPSY, ABDOMINAL WALL (N/A)  BLOCK, TRANSVERSUS ABDOMINIS PLANE (N/A)  HEMICOLECTOMY, RIGHT (Right)   4 Days Post-Op     Interval History:3/19/2022 POD4 patient without complaints of pain.  Tolerating p.o..  High ileostomy output over 24 hours.  1200 mL yesterday.  Much decreased today.  Was started on Imodium Friday evening.  Denies any nausea vomiting.  Incision looks good without signs of any infection or dehiscence.  Will observe patient today.  If output is under 1000 ml then anticipate discharge tomorrow.    Medications:  Continuous Infusions:  Scheduled Meds:   acetaminophen  650 mg Oral Q6H    amoxicillin-clavulanate 875-125mg  1 tablet Oral Q12H    chlorhexidine  10 mL Mouth/Throat BID    loperamide  2 mg Oral TID     PRN Meds:albuterol sulfate, calcium carbonate, melatonin, naloxone, ondansetron, sodium chloride 0.9%, sodium chloride 0.9%     Review of patient's allergies indicates:  No Known Allergies  Objective:     Vital Signs (Most Recent):  Temp: 98.3 °F (36.8 °C) (03/19/22 1525)  Pulse: 80 (03/19/22 1525)  Resp: 18 (03/19/22 1525)  BP: (!) 126/59 (03/19/22 1525)  SpO2: 96 % (03/19/22 1525)   Vital Signs (24h Range):  Temp:  [97.4 °F (36.3 °C)-98.3 °F (36.8 °C)] 98.3 °F (36.8 °C)  Pulse:  [80-88] 80  Resp:  [17-18] 18  SpO2:  [95 %-97 %] 96 %  BP: (116-131)/(58-69) 126/59     Weight: 65.8 kg (145 lb 1 oz)  Body mass index is 16.76 kg/m².    Intake/Output - Last 3 Shifts         03/17 0700 03/18 0659 03/18 0700 03/19 0659 03/19 0700 03/20 0659    P.O. 120 240 540    I.V. (mL/kg) 865.2 (13) 268.6 (4.1)     IV Piggyback 300 149.9     Total Intake(mL/kg) 1285.3 (19.2) 658.5  (10) 540 (8.2)    Urine (mL/kg/hr) 950 (0.6) 200 (0.1) 100 (0.2)    Drains 150 215 165    Stool 1000 1445 290    Total Output 2100 1860 555    Net -814.7 -1201.5 -15           Urine Occurrence  5 x 2 x            Physical Exam  Vitals reviewed.   Constitutional:       General: He is not in acute distress.     Appearance: He is ill-appearing.   HENT:      Mouth/Throat:      Mouth: Mucous membranes are moist.   Cardiovascular:      Rate and Rhythm: Normal rate and regular rhythm.      Heart sounds: Normal heart sounds.   Pulmonary:      Effort: Pulmonary effort is normal.      Breath sounds: Normal breath sounds.   Abdominal:      General: Abdomen is flat. Bowel sounds are normal.      Comments: Ileostomy with liquid stool in bag.  Pink and viable.  Incision was good without signs of any infection.   Skin:     General: Skin is warm and dry.   Neurological:      General: No focal deficit present.      Mental Status: He is alert and oriented to person, place, and time.   Psychiatric:         Mood and Affect: Mood normal.         Behavior: Behavior normal.         Thought Content: Thought content normal.         Judgment: Judgment normal.       Significant Labs:  I have reviewed all pertinent lab results within the past 24 hours.  CBC:   Recent Labs   Lab 03/19/22 0715   WBC 15.55*   RBC 3.83*   HGB 10.6*   HCT 33.0*   *   MCV 86   MCH 27.7   MCHC 32.1     BMP:   Recent Labs   Lab 03/17/22  0911 03/18/22  1238 03/19/22  0715   GLU 89   < > 105   *   < > 133*   K 4.0   < > 3.4*      < > 98   CO2 27   < > 27   BUN 19   < > 12   CREATININE 0.6   < > 0.6   CALCIUM 8.3*   < > 8.4*   MG 1.8  --   --     < > = values in this interval not displayed.     CMP:   Recent Labs   Lab 03/17/22  0911 03/18/22  1238 03/19/22  0715   GLU 89   < > 105   CALCIUM 8.3*   < > 8.4*   ALBUMIN 2.1*  --   --    PROT 5.4*  --   --    *   < > 133*   K 4.0   < > 3.4*   CO2 27   < > 27      < > 98   BUN 19   < > 12    CREATININE 0.6   < > 0.6   ALKPHOS 68  --   --    ALT 10  --   --    AST 17  --   --    BILITOT 1.0  --   --     < > = values in this interval not displayed.     LFTs:   Recent Labs   Lab 03/17/22  0911   ALT 10   AST 17   ALKPHOS 68   BILITOT 1.0   PROT 5.4*   ALBUMIN 2.1*       Significant Diagnostics:  I have reviewed all pertinent imaging results/findings within the past 24 hours.    Assessment/Plan:     * Malignant neoplasm of ascending colon  80yo M with R colon adenocarcinoma now s/p open RHC with end ileostomy on 3/15/22    - Reg diet  - HLIV  - PO augmentin given stool spillage in OR and postop leukocytosis  - monitor ostomy output given >1L output yesterday. Started on imodium. Decreased output today.  - PO pain control  - WCON c/s for stoma education, care and supplies  - PPx: LVNX  - IS 10xs/hr while awake  - OOB, ambulation encouraged. PT/OT on board.  - will eventually DC home on hospice (was on it preop, so will resume)        Deidre Drew MD  General Surgery  [facility]

## 2022-03-19 NOTE — SUBJECTIVE & OBJECTIVE
Interval History:3/19/2022 POD4 patient without complaints of pain.  Tolerating p.o..  High ileostomy output over 24 hours.  1200 mL yesterday.  Much decreased today.  Was started on Imodium Friday evening.  Denies any nausea vomiting.  Incision looks good without signs of any infection or dehiscence.  Will observe patient today.  If output is under 1000 ml then anticipate discharge tomorrow.    Medications:  Continuous Infusions:  Scheduled Meds:   acetaminophen  650 mg Oral Q6H    amoxicillin-clavulanate 875-125mg  1 tablet Oral Q12H    chlorhexidine  10 mL Mouth/Throat BID    loperamide  2 mg Oral TID     PRN Meds:albuterol sulfate, calcium carbonate, melatonin, naloxone, ondansetron, sodium chloride 0.9%, sodium chloride 0.9%     Review of patient's allergies indicates:  No Known Allergies  Objective:     Vital Signs (Most Recent):  Temp: 98.3 °F (36.8 °C) (03/19/22 1525)  Pulse: 80 (03/19/22 1525)  Resp: 18 (03/19/22 1525)  BP: (!) 126/59 (03/19/22 1525)  SpO2: 96 % (03/19/22 1525)   Vital Signs (24h Range):  Temp:  [97.4 °F (36.3 °C)-98.3 °F (36.8 °C)] 98.3 °F (36.8 °C)  Pulse:  [80-88] 80  Resp:  [17-18] 18  SpO2:  [95 %-97 %] 96 %  BP: (116-131)/(58-69) 126/59     Weight: 65.8 kg (145 lb 1 oz)  Body mass index is 16.76 kg/m².    Intake/Output - Last 3 Shifts         03/17 0700  03/18 0659 03/18 0700  03/19 0659 03/19 0700  03/20 0659    P.O. 120 240 540    I.V. (mL/kg) 865.2 (13) 268.6 (4.1)     IV Piggyback 300 149.9     Total Intake(mL/kg) 1285.3 (19.2) 658.5 (10) 540 (8.2)    Urine (mL/kg/hr) 950 (0.6) 200 (0.1) 100 (0.2)    Drains 150 215 165    Stool 1000 1445 290    Total Output 2100 1860 555    Net -814.7 -1201.5 -15           Urine Occurrence  5 x 2 x            Physical Exam  Vitals reviewed.   Constitutional:       General: He is not in acute distress.     Appearance: He is ill-appearing.   HENT:      Mouth/Throat:      Mouth: Mucous membranes are moist.   Cardiovascular:      Rate and Rhythm:  Normal rate and regular rhythm.      Heart sounds: Normal heart sounds.   Pulmonary:      Effort: Pulmonary effort is normal.      Breath sounds: Normal breath sounds.   Abdominal:      General: Abdomen is flat. Bowel sounds are normal.      Comments: Ileostomy with liquid stool in bag.  Pink and viable.  Incision was good without signs of any infection.   Skin:     General: Skin is warm and dry.   Neurological:      General: No focal deficit present.      Mental Status: He is alert and oriented to person, place, and time.   Psychiatric:         Mood and Affect: Mood normal.         Behavior: Behavior normal.         Thought Content: Thought content normal.         Judgment: Judgment normal.       Significant Labs:  I have reviewed all pertinent lab results within the past 24 hours.  CBC:   Recent Labs   Lab 03/19/22 0715   WBC 15.55*   RBC 3.83*   HGB 10.6*   HCT 33.0*   *   MCV 86   MCH 27.7   MCHC 32.1     BMP:   Recent Labs   Lab 03/17/22  0911 03/18/22  1238 03/19/22  0715   GLU 89   < > 105   *   < > 133*   K 4.0   < > 3.4*      < > 98   CO2 27   < > 27   BUN 19   < > 12   CREATININE 0.6   < > 0.6   CALCIUM 8.3*   < > 8.4*   MG 1.8  --   --     < > = values in this interval not displayed.     CMP:   Recent Labs   Lab 03/17/22  0911 03/18/22  1238 03/19/22  0715   GLU 89   < > 105   CALCIUM 8.3*   < > 8.4*   ALBUMIN 2.1*  --   --    PROT 5.4*  --   --    *   < > 133*   K 4.0   < > 3.4*   CO2 27   < > 27      < > 98   BUN 19   < > 12   CREATININE 0.6   < > 0.6   ALKPHOS 68  --   --    ALT 10  --   --    AST 17  --   --    BILITOT 1.0  --   --     < > = values in this interval not displayed.     LFTs:   Recent Labs   Lab 03/17/22  0911   ALT 10   AST 17   ALKPHOS 68   BILITOT 1.0   PROT 5.4*   ALBUMIN 2.1*       Significant Diagnostics:  I have reviewed all pertinent imaging results/findings within the past 24 hours.

## 2022-03-19 NOTE — ASSESSMENT & PLAN NOTE
80yo M with R colon adenocarcinoma now s/p open RHC with end ileostomy on 3/15/22    - Reg diet  - HLIV  - PO augmentin given stool spillage in OR and postop leukocytosis  - monitor ostomy output given >1L output yesterday. Started on imodium. Decreased output today.  - PO pain control  - WCON c/s for stoma education, care and supplies  - PPx: LVNX  - IS 10xs/hr while awake  - OOB, ambulation encouraged. PT/OT on board.  - will eventually DC home on hospice (was on it preop, so will resume)

## 2022-03-20 LAB
ANION GAP SERPL CALC-SCNC: 9 MMOL/L (ref 8–16)
BASOPHILS # BLD AUTO: 0.07 K/UL (ref 0–0.2)
BASOPHILS NFR BLD: 0.5 % (ref 0–1.9)
BUN SERPL-MCNC: 10 MG/DL (ref 8–23)
CALCIUM SERPL-MCNC: 8.5 MG/DL (ref 8.7–10.5)
CHLORIDE SERPL-SCNC: 101 MMOL/L (ref 95–110)
CO2 SERPL-SCNC: 23 MMOL/L (ref 23–29)
CREAT SERPL-MCNC: 0.5 MG/DL (ref 0.5–1.4)
DIFFERENTIAL METHOD: ABNORMAL
EOSINOPHIL # BLD AUTO: 0.7 K/UL (ref 0–0.5)
EOSINOPHIL NFR BLD: 5.5 % (ref 0–8)
ERYTHROCYTE [DISTWIDTH] IN BLOOD BY AUTOMATED COUNT: 14.4 % (ref 11.5–14.5)
EST. GFR  (AFRICAN AMERICAN): >60 ML/MIN/1.73 M^2
EST. GFR  (NON AFRICAN AMERICAN): >60 ML/MIN/1.73 M^2
GLUCOSE SERPL-MCNC: 97 MG/DL (ref 70–110)
HCT VFR BLD AUTO: 32.2 % (ref 40–54)
HGB BLD-MCNC: 10.4 G/DL (ref 14–18)
IMM GRANULOCYTES # BLD AUTO: 0.18 K/UL (ref 0–0.04)
IMM GRANULOCYTES NFR BLD AUTO: 1.4 % (ref 0–0.5)
LYMPHOCYTES # BLD AUTO: 0.9 K/UL (ref 1–4.8)
LYMPHOCYTES NFR BLD: 7.1 % (ref 18–48)
MCH RBC QN AUTO: 27.8 PG (ref 27–31)
MCHC RBC AUTO-ENTMCNC: 32.3 G/DL (ref 32–36)
MCV RBC AUTO: 86 FL (ref 82–98)
MONOCYTES # BLD AUTO: 0.9 K/UL (ref 0.3–1)
MONOCYTES NFR BLD: 7.1 % (ref 4–15)
NEUTROPHILS # BLD AUTO: 10.4 K/UL (ref 1.8–7.7)
NEUTROPHILS NFR BLD: 78.4 % (ref 38–73)
NRBC BLD-RTO: 0 /100 WBC
PLATELET # BLD AUTO: 589 K/UL (ref 150–450)
PMV BLD AUTO: 9.9 FL (ref 9.2–12.9)
POTASSIUM SERPL-SCNC: 4.1 MMOL/L (ref 3.5–5.1)
RBC # BLD AUTO: 3.74 M/UL (ref 4.6–6.2)
SODIUM SERPL-SCNC: 133 MMOL/L (ref 136–145)
WBC # BLD AUTO: 13.23 K/UL (ref 3.9–12.7)

## 2022-03-20 PROCEDURE — 25000003 PHARM REV CODE 250: Performed by: SURGERY

## 2022-03-20 PROCEDURE — 11000001 HC ACUTE MED/SURG PRIVATE ROOM

## 2022-03-20 PROCEDURE — 80048 BASIC METABOLIC PNL TOTAL CA: CPT | Performed by: COLON & RECTAL SURGERY

## 2022-03-20 PROCEDURE — 85025 COMPLETE CBC W/AUTO DIFF WBC: CPT | Performed by: COLON & RECTAL SURGERY

## 2022-03-20 PROCEDURE — 36415 COLL VENOUS BLD VENIPUNCTURE: CPT | Performed by: COLON & RECTAL SURGERY

## 2022-03-20 PROCEDURE — 25000003 PHARM REV CODE 250

## 2022-03-20 PROCEDURE — 25000003 PHARM REV CODE 250: Performed by: COLON & RECTAL SURGERY

## 2022-03-20 RX ORDER — DEXTROSE MONOHYDRATE, SODIUM CHLORIDE, AND POTASSIUM CHLORIDE 50; 1.49; 9 G/1000ML; G/1000ML; G/1000ML
INJECTION, SOLUTION INTRAVENOUS CONTINUOUS
Status: DISCONTINUED | OUTPATIENT
Start: 2022-03-20 | End: 2022-03-21

## 2022-03-20 RX ADMIN — AMOXICILLIN AND CLAVULANATE POTASSIUM 1 TABLET: 875; 125 TABLET, FILM COATED ORAL at 10:03

## 2022-03-20 RX ADMIN — AMOXICILLIN AND CLAVULANATE POTASSIUM 1 TABLET: 875; 125 TABLET, FILM COATED ORAL at 08:03

## 2022-03-20 RX ADMIN — LOPERAMIDE HYDROCHLORIDE 2 MG: 2 CAPSULE ORAL at 03:03

## 2022-03-20 RX ADMIN — DEXTROSE, SODIUM CHLORIDE, AND POTASSIUM CHLORIDE: 5; .9; .15 INJECTION INTRAVENOUS at 01:03

## 2022-03-20 RX ADMIN — ACETAMINOPHEN 650 MG: 325 TABLET ORAL at 12:03

## 2022-03-20 RX ADMIN — ACETAMINOPHEN 650 MG: 325 TABLET ORAL at 01:03

## 2022-03-20 RX ADMIN — LOPERAMIDE HYDROCHLORIDE 2 MG: 2 CAPSULE ORAL at 08:03

## 2022-03-20 RX ADMIN — ACETAMINOPHEN 650 MG: 325 TABLET ORAL at 05:03

## 2022-03-20 RX ADMIN — CHLORHEXIDINE GLUCONATE 0.12% ORAL RINSE 10 ML: 1.2 LIQUID ORAL at 10:03

## 2022-03-20 RX ADMIN — ACETAMINOPHEN 650 MG: 325 TABLET ORAL at 11:03

## 2022-03-20 RX ADMIN — LOPERAMIDE HYDROCHLORIDE 2 MG: 2 CAPSULE ORAL at 10:03

## 2022-03-20 NOTE — SUBJECTIVE & OBJECTIVE
Interval History:  Patient denies any abdominal pain.  Ileostomy output is essentially liquids.  There was 1.9 L yesterday.  Drain output is slowing    Medications:  Continuous Infusions:   dextrose 5 % and 0.9 % NaCl with KCl 20 mEq       Scheduled Meds:   acetaminophen  650 mg Oral Q6H    amoxicillin-clavulanate 875-125mg  1 tablet Oral Q12H    chlorhexidine  10 mL Mouth/Throat BID    loperamide  2 mg Oral TID     PRN Meds:albuterol sulfate, calcium carbonate, melatonin, naloxone, ondansetron, sodium chloride 0.9%, sodium chloride 0.9%     Review of patient's allergies indicates:  No Known Allergies  Objective:     Vital Signs (Most Recent):  Temp: 98.5 °F (36.9 °C) (03/20/22 0730)  Pulse: 69 (03/20/22 0730)  Resp: 18 (03/20/22 0730)  BP: (!) 111/57 (03/20/22 0730)  SpO2: 97 % (03/20/22 0730)   Vital Signs (24h Range):  Temp:  [97.8 °F (36.6 °C)-98.5 °F (36.9 °C)] 98.5 °F (36.9 °C)  Pulse:  [68-80] 69  Resp:  [17-18] 18  SpO2:  [96 %-97 %] 97 %  BP: (108-126)/(55-61) 111/57     Weight: 65.7 kg (144 lb 13.5 oz)  Body mass index is 16.74 kg/m².    Intake/Output - Last 3 Shifts         03/18 0700  03/19 0659 03/19 0700 03/20 0659 03/20 0700 03/21 0659    P.O. 240 660     I.V. (mL/kg) 268.6 (4.1)      IV Piggyback 149.9      Total Intake(mL/kg) 658.5 (10) 660 (10)     Urine (mL/kg/hr) 200 (0.1) 525 (0.3)     Drains 215 285     Stool 1445 1915     Total Output 1860 2725     Net -1201.5 -2065            Urine Occurrence 5 x 2 x             Physical Exam  Vitals and nursing note reviewed.   Constitutional:       General: He is not in acute distress.     Appearance: He is well-developed. He is ill-appearing (Chronically).      Comments: Extremely cachectic   HENT:      Head: Normocephalic and atraumatic.      Nose: Nose normal.      Mouth/Throat:      Mouth: Mucous membranes are moist.   Eyes:      General: No scleral icterus.     Pupils: Pupils are equal, round, and reactive to light.   Neck:      Thyroid: No  thyromegaly.      Vascular: No JVD.      Trachea: No tracheal deviation.   Cardiovascular:      Rate and Rhythm: Normal rate and regular rhythm.      Heart sounds: Normal heart sounds.   Pulmonary:      Effort: Pulmonary effort is normal.      Breath sounds: Normal breath sounds.   Abdominal:      General: Bowel sounds are normal. There is no distension.      Palpations: Abdomen is soft. There is no mass.      Tenderness: There is no abdominal tenderness. There is no guarding or rebound.      Comments: Thin, incision is clean.  Well-formed ileostomy on the right.  Drain on the left with serous output   Genitourinary:     Penis: Normal.    Musculoskeletal:         General: Normal range of motion.      Cervical back: Normal range of motion and neck supple.   Lymphadenopathy:      Cervical: No cervical adenopathy.   Skin:     General: Skin is warm and dry.      Capillary Refill: Capillary refill takes less than 2 seconds.   Neurological:      General: No focal deficit present.      Mental Status: He is alert and oriented to person, place, and time.   Psychiatric:         Mood and Affect: Mood normal.         Behavior: Behavior normal.         Thought Content: Thought content normal.         Judgment: Judgment normal.       Significant Labs:  I have reviewed all pertinent lab results within the past 24 hours.  CBC:   Recent Labs   Lab 03/20/22  0648   WBC 13.23*   RBC 3.74*   HGB 10.4*   HCT 32.2*   *   MCV 86   MCH 27.8   MCHC 32.3     BMP:   Recent Labs   Lab 03/17/22  0911 03/18/22  1238 03/20/22  0648   GLU 89   < > 97   *   < > 133*   K 4.0   < > 4.1      < > 101   CO2 27   < > 23   BUN 19   < > 10   CREATININE 0.6   < > 0.5   CALCIUM 8.3*   < > 8.5*   MG 1.8  --   --     < > = values in this interval not displayed.       Significant Diagnostics:  I have reviewed all pertinent imaging results/findings within the past 24 hours.  No new

## 2022-03-20 NOTE — PROGRESS NOTES
[facility] Surg  General Surgery  Progress Note    Subjective:     History of Present Illness:  80yo M with R colon adenocarcinoma who presents for definitive surgical management      Post-Op Info:  Procedure(s) (LRB):  COLECTOMY, RIGHT OPEN WITH END ILEOSTOMY CONSTRUCTION (N/A)  BIOPSY, LIVER (N/A)  BIOPSY, PERITONEUM (N/A)  BIOPSY, ABDOMINAL WALL (N/A)  BLOCK, TRANSVERSUS ABDOMINIS PLANE (N/A)  HEMICOLECTOMY, RIGHT (Right)   5 Days Post-Op     Interval History:  Patient denies any abdominal pain.  Ileostomy output is essentially liquids.  There was 1.9 L yesterday.  Drain output is slowing    Medications:  Continuous Infusions:   dextrose 5 % and 0.9 % NaCl with KCl 20 mEq       Scheduled Meds:   acetaminophen  650 mg Oral Q6H    amoxicillin-clavulanate 875-125mg  1 tablet Oral Q12H    chlorhexidine  10 mL Mouth/Throat BID    loperamide  2 mg Oral TID     PRN Meds:albuterol sulfate, calcium carbonate, melatonin, naloxone, ondansetron, sodium chloride 0.9%, sodium chloride 0.9%     Review of patient's allergies indicates:  No Known Allergies  Objective:     Vital Signs (Most Recent):  Temp: 98.5 °F (36.9 °C) (03/20/22 0730)  Pulse: 69 (03/20/22 0730)  Resp: 18 (03/20/22 0730)  BP: (!) 111/57 (03/20/22 0730)  SpO2: 97 % (03/20/22 0730)   Vital Signs (24h Range):  Temp:  [97.8 °F (36.6 °C)-98.5 °F (36.9 °C)] 98.5 °F (36.9 °C)  Pulse:  [68-80] 69  Resp:  [17-18] 18  SpO2:  [96 %-97 %] 97 %  BP: (108-126)/(55-61) 111/57     Weight: 65.7 kg (144 lb 13.5 oz)  Body mass index is 16.74 kg/m².    Intake/Output - Last 3 Shifts         03/18 0700  03/19 0659 03/19 0700 03/20 0659 03/20 0700 03/21 0659    P.O. 240 660     I.V. (mL/kg) 268.6 (4.1)      IV Piggyback 149.9      Total Intake(mL/kg) 658.5 (10) 660 (10)     Urine (mL/kg/hr) 200 (0.1) 525 (0.3)     Drains 215 285     Stool 1445 1915     Total Output 1860 2725     Net -1201.5 -2065            Urine Occurrence 5 x 2 x             Physical Exam  Vitals and  nursing note reviewed.   Constitutional:       General: He is not in acute distress.     Appearance: He is well-developed. He is ill-appearing (Chronically).      Comments: Extremely cachectic   HENT:      Head: Normocephalic and atraumatic.      Nose: Nose normal.      Mouth/Throat:      Mouth: Mucous membranes are moist.   Eyes:      General: No scleral icterus.     Pupils: Pupils are equal, round, and reactive to light.   Neck:      Thyroid: No thyromegaly.      Vascular: No JVD.      Trachea: No tracheal deviation.   Cardiovascular:      Rate and Rhythm: Normal rate and regular rhythm.      Heart sounds: Normal heart sounds.   Pulmonary:      Effort: Pulmonary effort is normal.      Breath sounds: Normal breath sounds.   Abdominal:      General: Bowel sounds are normal. There is no distension.      Palpations: Abdomen is soft. There is no mass.      Tenderness: There is no abdominal tenderness. There is no guarding or rebound.      Comments: Thin, incision is clean.  Well-formed ileostomy on the right.  Drain on the left with serous output   Genitourinary:     Penis: Normal.    Musculoskeletal:         General: Normal range of motion.      Cervical back: Normal range of motion and neck supple.   Lymphadenopathy:      Cervical: No cervical adenopathy.   Skin:     General: Skin is warm and dry.      Capillary Refill: Capillary refill takes less than 2 seconds.   Neurological:      General: No focal deficit present.      Mental Status: He is alert and oriented to person, place, and time.   Psychiatric:         Mood and Affect: Mood normal.         Behavior: Behavior normal.         Thought Content: Thought content normal.         Judgment: Judgment normal.       Significant Labs:  I have reviewed all pertinent lab results within the past 24 hours.  CBC:   Recent Labs   Lab 03/20/22  0648   WBC 13.23*   RBC 3.74*   HGB 10.4*   HCT 32.2*   *   MCV 86   MCH 27.8   MCHC 32.3     BMP:   Recent Labs   Lab  03/17/22  0911 03/18/22  1238 03/20/22  0648   GLU 89   < > 97   *   < > 133*   K 4.0   < > 4.1      < > 101   CO2 27   < > 23   BUN 19   < > 10   CREATININE 0.6   < > 0.5   CALCIUM 8.3*   < > 8.5*   MG 1.8  --   --     < > = values in this interval not displayed.       Significant Diagnostics:  I have reviewed all pertinent imaging results/findings within the past 24 hours.  No new    Assessment/Plan:     * Malignant neoplasm of ascending colon  82yo M with R colon adenocarcinoma now s/p open RHC with end ileostomy on 3/15/22    - Reg diet  - HLIV  - PO augmentin given stool spillage in OR and postop leukocytosis  - monitor ostomy output given 1.9 L output yesterday. Started on imodium.   Start IV fluids to avoid dehydration  A.m. labs.  - PO pain control  - WCON c/s for stoma education, care and supplies  - PPx: LVNX  - IS 10xs/hr while awake  - OOB, ambulation encouraged. PT/OT on board.  - will eventually DC home on hospice (was on it preop, so will resume), however ileostomy output needs to be better controlled        Apolinar Botello MD  General Surgery  [facility] Med S[facility]

## 2022-03-20 NOTE — ASSESSMENT & PLAN NOTE
82yo M with R colon adenocarcinoma now s/p open RHC with end ileostomy on 3/15/22    - Reg diet  - HLIV  - PO augmentin given stool spillage in OR and postop leukocytosis  - monitor ostomy output given 1.9 L output yesterday. Started on imodium.   Start IV fluids to avoid dehydration  A.m. labs.  - PO pain control  - WCON c/s for stoma education, care and supplies  - PPx: LVNX  - IS 10xs/hr while awake  - OOB, ambulation encouraged. PT/OT on board.  - will eventually DC home on hospice (was on it preop, so will resume), however ileostomy output needs to be better controlled

## 2022-03-21 LAB
ANION GAP SERPL CALC-SCNC: 9 MMOL/L (ref 8–16)
ANION GAP SERPL CALC-SCNC: 9 MMOL/L (ref 8–16)
BASOPHILS # BLD AUTO: 0.09 K/UL (ref 0–0.2)
BASOPHILS # BLD AUTO: 0.09 K/UL (ref 0–0.2)
BASOPHILS NFR BLD: 0.7 % (ref 0–1.9)
BASOPHILS NFR BLD: 0.7 % (ref 0–1.9)
BUN SERPL-MCNC: 8 MG/DL (ref 8–23)
BUN SERPL-MCNC: 8 MG/DL (ref 8–23)
CALCIUM SERPL-MCNC: 8.3 MG/DL (ref 8.7–10.5)
CALCIUM SERPL-MCNC: 8.3 MG/DL (ref 8.7–10.5)
CHLORIDE SERPL-SCNC: 107 MMOL/L (ref 95–110)
CHLORIDE SERPL-SCNC: 107 MMOL/L (ref 95–110)
CO2 SERPL-SCNC: 18 MMOL/L (ref 23–29)
CO2 SERPL-SCNC: 18 MMOL/L (ref 23–29)
CREAT SERPL-MCNC: 0.5 MG/DL (ref 0.5–1.4)
CREAT SERPL-MCNC: 0.5 MG/DL (ref 0.5–1.4)
DIFFERENTIAL METHOD: ABNORMAL
DIFFERENTIAL METHOD: ABNORMAL
EOSINOPHIL # BLD AUTO: 0.9 K/UL (ref 0–0.5)
EOSINOPHIL # BLD AUTO: 0.9 K/UL (ref 0–0.5)
EOSINOPHIL NFR BLD: 7.1 % (ref 0–8)
EOSINOPHIL NFR BLD: 7.1 % (ref 0–8)
ERYTHROCYTE [DISTWIDTH] IN BLOOD BY AUTOMATED COUNT: 14.6 % (ref 11.5–14.5)
ERYTHROCYTE [DISTWIDTH] IN BLOOD BY AUTOMATED COUNT: 14.6 % (ref 11.5–14.5)
EST. GFR  (AFRICAN AMERICAN): >60 ML/MIN/1.73 M^2
EST. GFR  (AFRICAN AMERICAN): >60 ML/MIN/1.73 M^2
EST. GFR  (NON AFRICAN AMERICAN): >60 ML/MIN/1.73 M^2
EST. GFR  (NON AFRICAN AMERICAN): >60 ML/MIN/1.73 M^2
GLUCOSE SERPL-MCNC: 98 MG/DL (ref 70–110)
GLUCOSE SERPL-MCNC: 98 MG/DL (ref 70–110)
HCT VFR BLD AUTO: 32.8 % (ref 40–54)
HCT VFR BLD AUTO: 32.8 % (ref 40–54)
HGB BLD-MCNC: 10.3 G/DL (ref 14–18)
HGB BLD-MCNC: 10.3 G/DL (ref 14–18)
IMM GRANULOCYTES # BLD AUTO: 0.35 K/UL (ref 0–0.04)
IMM GRANULOCYTES # BLD AUTO: 0.35 K/UL (ref 0–0.04)
IMM GRANULOCYTES NFR BLD AUTO: 2.7 % (ref 0–0.5)
IMM GRANULOCYTES NFR BLD AUTO: 2.7 % (ref 0–0.5)
LYMPHOCYTES # BLD AUTO: 1.2 K/UL (ref 1–4.8)
LYMPHOCYTES # BLD AUTO: 1.2 K/UL (ref 1–4.8)
LYMPHOCYTES NFR BLD: 8.9 % (ref 18–48)
LYMPHOCYTES NFR BLD: 8.9 % (ref 18–48)
MAGNESIUM SERPL-MCNC: 1.9 MG/DL (ref 1.6–2.6)
MCH RBC QN AUTO: 27.5 PG (ref 27–31)
MCH RBC QN AUTO: 27.5 PG (ref 27–31)
MCHC RBC AUTO-ENTMCNC: 31.4 G/DL (ref 32–36)
MCHC RBC AUTO-ENTMCNC: 31.4 G/DL (ref 32–36)
MCV RBC AUTO: 88 FL (ref 82–98)
MCV RBC AUTO: 88 FL (ref 82–98)
MONOCYTES # BLD AUTO: 1.2 K/UL (ref 0.3–1)
MONOCYTES # BLD AUTO: 1.2 K/UL (ref 0.3–1)
MONOCYTES NFR BLD: 9.5 % (ref 4–15)
MONOCYTES NFR BLD: 9.5 % (ref 4–15)
NEUTROPHILS # BLD AUTO: 9.2 K/UL (ref 1.8–7.7)
NEUTROPHILS # BLD AUTO: 9.2 K/UL (ref 1.8–7.7)
NEUTROPHILS NFR BLD: 71.1 % (ref 38–73)
NEUTROPHILS NFR BLD: 71.1 % (ref 38–73)
NRBC BLD-RTO: 0 /100 WBC
NRBC BLD-RTO: 0 /100 WBC
PHOSPHATE SERPL-MCNC: 3 MG/DL (ref 2.7–4.5)
PLATELET # BLD AUTO: 517 K/UL (ref 150–450)
PLATELET # BLD AUTO: 517 K/UL (ref 150–450)
PMV BLD AUTO: 9.5 FL (ref 9.2–12.9)
PMV BLD AUTO: 9.5 FL (ref 9.2–12.9)
POTASSIUM SERPL-SCNC: 4.8 MMOL/L (ref 3.5–5.1)
POTASSIUM SERPL-SCNC: 4.8 MMOL/L (ref 3.5–5.1)
RBC # BLD AUTO: 3.75 M/UL (ref 4.6–6.2)
RBC # BLD AUTO: 3.75 M/UL (ref 4.6–6.2)
SODIUM SERPL-SCNC: 134 MMOL/L (ref 136–145)
SODIUM SERPL-SCNC: 134 MMOL/L (ref 136–145)
WBC # BLD AUTO: 12.92 K/UL (ref 3.9–12.7)
WBC # BLD AUTO: 12.92 K/UL (ref 3.9–12.7)

## 2022-03-21 PROCEDURE — 36415 COLL VENOUS BLD VENIPUNCTURE: CPT | Performed by: COLON & RECTAL SURGERY

## 2022-03-21 PROCEDURE — 25000003 PHARM REV CODE 250: Performed by: SURGERY

## 2022-03-21 PROCEDURE — 85025 COMPLETE CBC W/AUTO DIFF WBC: CPT | Performed by: COLON & RECTAL SURGERY

## 2022-03-21 PROCEDURE — 25000003 PHARM REV CODE 250: Performed by: COLON & RECTAL SURGERY

## 2022-03-21 PROCEDURE — 83735 ASSAY OF MAGNESIUM: CPT | Performed by: SURGERY

## 2022-03-21 PROCEDURE — 25000003 PHARM REV CODE 250

## 2022-03-21 PROCEDURE — 80048 BASIC METABOLIC PNL TOTAL CA: CPT | Performed by: COLON & RECTAL SURGERY

## 2022-03-21 PROCEDURE — 84100 ASSAY OF PHOSPHORUS: CPT | Performed by: SURGERY

## 2022-03-21 PROCEDURE — 11000001 HC ACUTE MED/SURG PRIVATE ROOM

## 2022-03-21 RX ORDER — LOPERAMIDE HYDROCHLORIDE 2 MG/1
4 CAPSULE ORAL 4 TIMES DAILY
Status: DISCONTINUED | OUTPATIENT
Start: 2022-03-21 | End: 2022-03-23 | Stop reason: HOSPADM

## 2022-03-21 RX ORDER — SODIUM CHLORIDE 450 MG/100ML
INJECTION, SOLUTION INTRAVENOUS CONTINUOUS
Status: DISCONTINUED | OUTPATIENT
Start: 2022-03-21 | End: 2022-03-22

## 2022-03-21 RX ADMIN — LOPERAMIDE HYDROCHLORIDE 4 MG: 2 CAPSULE ORAL at 09:03

## 2022-03-21 RX ADMIN — ACETAMINOPHEN 650 MG: 325 TABLET ORAL at 05:03

## 2022-03-21 RX ADMIN — LOPERAMIDE HYDROCHLORIDE 2 MG: 2 CAPSULE ORAL at 03:03

## 2022-03-21 RX ADMIN — LOPERAMIDE HYDROCHLORIDE 2 MG: 2 CAPSULE ORAL at 09:03

## 2022-03-21 RX ADMIN — DEXTROSE, SODIUM CHLORIDE, AND POTASSIUM CHLORIDE: 5; .9; .15 INJECTION INTRAVENOUS at 12:03

## 2022-03-21 RX ADMIN — Medication 6 MG: at 09:03

## 2022-03-21 RX ADMIN — ACETAMINOPHEN 650 MG: 325 TABLET ORAL at 11:03

## 2022-03-21 RX ADMIN — AMOXICILLIN AND CLAVULANATE POTASSIUM 1 TABLET: 875; 125 TABLET, FILM COATED ORAL at 09:03

## 2022-03-21 RX ADMIN — SODIUM CHLORIDE: 0.45 INJECTION, SOLUTION INTRAVENOUS at 09:03

## 2022-03-21 NOTE — PLAN OF CARE
Problem: Fall Injury Risk  Goal: Absence of Fall and Fall-Related Injury  Outcome: Ongoing, Progressing     Problem: Infection  Goal: Absence of Infection Signs and Symptoms  Outcome: Ongoing, Progressing     Problem: Adult Inpatient Plan of Care  Goal: Plan of Care Review  Outcome: Ongoing, Progressing  Goal: Patient-Specific Goal (Individualized)  Outcome: Ongoing, Progressing  Goal: Absence of Hospital-Acquired Illness or Injury  Outcome: Ongoing, Progressing  Goal: Optimal Comfort and Wellbeing  Outcome: Ongoing, Progressing  Goal: Readiness for Transition of Care  Outcome: Ongoing, Progressing

## 2022-03-21 NOTE — PLAN OF CARE
Pt remains free from injury/falls this shift. Safety precautions maintained. Pain managed with scheduled meds. Diet tolerated well. VSS. No signs and symptoms of acute distress noted at this time. Chart reviewed, will continue to monitor.

## 2022-03-21 NOTE — PLAN OF CARE
Pt remains free from falls/injuries this shift. Safety precautions maintained. Pain managed with pain medication. No s/s of acute distress noted. Will continue to monitor. Chart check completed.       Problem: Adult Inpatient Plan of Care  Goal: Plan of Care Review  Outcome: Ongoing, Progressing  Goal: Patient-Specific Goal (Individualized)  Outcome: Ongoing, Progressing  Goal: Absence of Hospital-Acquired Illness or Injury  Outcome: Ongoing, Progressing  Goal: Optimal Comfort and Wellbeing  Outcome: Ongoing, Progressing  Goal: Readiness for Transition of Care  Outcome: Ongoing, Progressing     Problem: Infection  Goal: Absence of Infection Signs and Symptoms  Outcome: Ongoing, Progressing     Problem: Fall Injury Risk  Goal: Absence of Fall and Fall-Related Injury  Outcome: Ongoing, Progressing

## 2022-03-21 NOTE — ASSESSMENT & PLAN NOTE
82yo M with R colon adenocarcinoma now s/p open RHC with end ileostomy on 3/15/22    - Reg diet  - HLIV  - PO augmentin given stool spillage in OR and postop leukocytosis  - monitor ostomy output given 1.7 L output yesterday. Continue imodium.  - PO pain control  - WCON c/s for stoma education, care and supplies  - IS 10xs/hr while awake  - OOB, ambulation encouraged. PT/OT on board.  - will eventually DC home on hospice (was on it preop, so will resume), however ileostomy output needs to be better controlled

## 2022-03-21 NOTE — PROGRESS NOTES
[facility] Surg  Colorectal Surgery  Progress Note    Subjective:     History of Present Illness:  80yo M with R colon adenocarcinoma who presents for definitive surgical management      Post-Op Info:  Procedure(s) (LRB):  COLECTOMY, RIGHT OPEN WITH END ILEOSTOMY CONSTRUCTION (N/A)  BIOPSY, LIVER (N/A)  BIOPSY, PERITONEUM (N/A)  BIOPSY, ABDOMINAL WALL (N/A)  BLOCK, TRANSVERSUS ABDOMINIS PLANE (N/A)  HEMICOLECTOMY, RIGHT (Right)   6 Days Post-Op     Interval History: POD 6. Does not complain of pain. Reports some ambulation and minimal oral intake. 1.7 L ostomy output yesterday. Denies fevers, chills, nausea, and vomiting. Minimal KIRK drain output.     Medications:  Continuous Infusions:   dextrose 5 % and 0.9 % NaCl with KCl 20 mEq 100 mL/hr at 03/21/22 0728     Scheduled Meds:   acetaminophen  650 mg Oral Q6H    amoxicillin-clavulanate 875-125mg  1 tablet Oral Q12H    loperamide  2 mg Oral TID     PRN Meds:albuterol sulfate, calcium carbonate, melatonin, naloxone, ondansetron, sodium chloride 0.9%, sodium chloride 0.9%     Review of patient's allergies indicates:  No Known Allergies  Objective:     Vital Signs (Most Recent):  Temp: 97.7 °F (36.5 °C) (03/21/22 0428)  Pulse: 67 (03/21/22 0428)  Resp: 17 (03/21/22 0428)  BP: (!) 125/58 (03/21/22 0428)  SpO2: 97 % (03/21/22 0428)   Vital Signs (24h Range):  Temp:  [97.7 °F (36.5 °C)-98.6 °F (37 °C)] 97.7 °F (36.5 °C)  Pulse:  [67-76] 67  Resp:  [17-18] 17  SpO2:  [96 %-99 %] 97 %  BP: (108-126)/(57-61) 125/58     Weight: 65.9 kg (145 lb 4.5 oz)  Body mass index is 16.79 kg/m².    Intake/Output - Last 3 Shifts         03/19 0700 03/20 0659 03/20 0700 03/21 0659 03/21 0700 03/22 0659    P.O. 660 120     I.V. (mL/kg)   1805.1 (27.4)    IV Piggyback       Total Intake(mL/kg) 660 (10) 120 (1.8) 1805.1 (27.4)    Urine (mL/kg/hr) 525 (0.3) 700 (0.4)     Drains 285 55     Stool 1915 1725     Total Output 2725 2480     Net -2065 -2360 +1805.1           Urine Occurrence  2 x 1 x             Physical Exam  Vitals and nursing note reviewed.   Constitutional:       General: He is not in acute distress.     Appearance: He is well-developed. He is not ill-appearing.   HENT:      Head: Normocephalic and atraumatic.      Right Ear: External ear normal.      Left Ear: External ear normal.   Eyes:      Extraocular Movements: Extraocular movements intact.      Conjunctiva/sclera: Conjunctivae normal.   Cardiovascular:      Rate and Rhythm: Normal rate.   Pulmonary:      Effort: Pulmonary effort is normal. No respiratory distress.   Abdominal:      General: There is no distension.      Palpations: Abdomen is soft.      Tenderness: There is no abdominal tenderness.      Comments: Soft, non-distended abdomen. Midline surgical site with staples in place, clean and dry without signs of infection. KIRK drain in place with minimal serous output. Ileostomy with mostly liquid stool output.    Musculoskeletal:      Cervical back: Normal range of motion and neck supple.   Skin:     General: Skin is warm and dry.   Neurological:      General: No focal deficit present.      Mental Status: He is alert.   Psychiatric:         Behavior: Behavior normal.       Significant Labs:  I have reviewed all pertinent lab results within the past 24 hours.  CBC:   Recent Labs   Lab 03/21/22  0548   WBC 12.92*  12.92*   RBC 3.75*  3.75*   HGB 10.3*  10.3*   HCT 32.8*  32.8*   *  517*   MCV 88  88   MCH 27.5  27.5   MCHC 31.4*  31.4*     CMP:   Recent Labs   Lab 03/17/22  0911 03/18/22  1238 03/21/22  0548   GLU 89   < > 98  98   CALCIUM 8.3*   < > 8.3*  8.3*   ALBUMIN 2.1*  --   --    PROT 5.4*  --   --    *   < > 134*  134*   K 4.0   < > 4.8  4.8   CO2 27   < > 18*  18*      < > 107  107   BUN 19   < > 8  8   CREATININE 0.6   < > 0.5  0.5   ALKPHOS 68  --   --    ALT 10  --   --    AST 17  --   --    BILITOT 1.0  --   --     < > = values in this interval not displayed.       Significant  Diagnostics:  I have reviewed all pertinent imaging results/findings within the past 24 hours.  No new pertinent imaging.     Assessment/Plan:     * Malignant neoplasm of ascending colon  80yo M with R colon adenocarcinoma now s/p open RHC with end ileostomy on 3/15/22    - Reg diet  - HLIV  - PO augmentin given stool spillage in OR and postop leukocytosis  - monitor ostomy output given 1.7 L output yesterday. Continue imodium.  - PO pain control  - WCON c/s for stoma education, care and supplies  - IS 10xs/hr while awake  - OOB, ambulation encouraged. PT/OT on board.  - will eventually DC home on hospice (was on it preop, so will resume), however ileostomy output needs to be better controlled        MARICRUZ JordanC  Colorectal Surgery  [facility] Med Surg

## 2022-03-21 NOTE — PHYSICIAN QUERY
PT Name: Jag Tipton  MR #: [id number]    DOCUMENTATION CLARIFICATION     CDS: Brandy Capley, RN  Email: [email or web address]    This form is a permanent document in the medical record.     Query Date: March 21, 2022    By submitting this query, we are merely seeking further clarification of documentation.. Please utilize your independent clinical judgment when addressing the question(s) below.    The medical record contains the following:   Indicators  Supporting Clinical Findings Location in Medical Record   X % of Estimated Energy Intake over a time frame from p.o., TF, or TPN   Energy Intake: <75% of estimated energy requirement for  >7 days    Severe Weight Loss (Malnutrition): greater than 7.5% in 3 months     Nutrition progress notes 3/17   X Weight Status over a time frame Weight Loss: 40 lbs, >7.5% x ~ 3 months   Nutrition progress notes 3/17   X Subcutaneous Fat and/or Muscle Loss Body Fat Depletion:   mild, moderate and severe depletion of orbitals, triceps and thoracic and lumbar region     Muscle Mass Depletion:   mild, moderate and severe depletion of temples, clavicle region, scapular region, interosseous muscle and lower extremities     NFPE performed indicating moderate to severe muscle and fat wasting    Subcutaneous Fat Loss (Final Summary): severe protein-calorie malnutrition  Muscle Loss Evaluation (Final Summary): severe protein-calorie malnutrition     Nutrition progress notes 3/17   X Fluid Accumulation or Edema Edema: 3+ legs   Nutrition progress notes 3/17   X Wt/BMI/Usual Body Weight Weight: 71 kg (156 lb 8.4 oz)  Weight (lb): 156.53 lb  Ideal Body Weight (IBW), Male: 214 lb  % Ideal Body Weight, Male (lb): 64.39 %  BMI (Calculated): 18.1   Nutrition progress notes 3/17    Delayed Wound Healing/Failure to Thrive     X Acute or Chronic Illness Malignant neoplasm of ascending colon [C18.2]     Technical Procedures Used:   1. Open right hemicolectomy  2. Liver biopsy  3. Peritoneum biopsy  4.  Abdominal wall biopsy  5. End ileostomy construction    Findings of the Procedure:    Right colon adenocarcinoma with evidence of perforation into the right lateral and right anterior abdominal sidewall with peritoneal seeding, likely residual carcinoma stuck to the duodenum and evidence of metastatic disease in both lobes of the liver   Op note 3/15    Medication     X Treatment Dietary nutrition supplements [facility]; Boost Plus - Any flavor, Boost Nutritional Pudding - Any flavor  All Meals      Orders 3/17   X Other  We discussed that with ongoing fecal incontinence, severe weight loss and malnutrition, end ileostomy should be considered and he is comfortable with this and would like to proceed with an ileostomy.     Cachectic     Severe Protein-Calorie Malnutrition in the context of Acute Illness/Injury    Related to (etiology):    Inadequate ability to consume sufficient energy    Inadequate oral intake    Decreased appetite    Increased energy and protein needs    Medical condition    Interventions(treatment strategy):    Enteral nutrition     general, healthful texture modified diet    Nutrition supplement    Collaboration with other care providers    Reason for assessment: identified at risk by screening criteria   Diagnosis: Malignant neoplasm of ascending colon     General information comments:   03/17/2022: RD spoke with pt and 2 sons at bedside. Family reports pt with worsening intake PTA; ~ 40 lb wt loss since January (no weight hx per EMR for comparison); recently began to provide Boost daily and pureed foods which improved intake. Pt has received 2 meals since admit (B&L today) with <50% intake. Agreeable to soft and bite sized foods and Boost daily. I provided a menu, discussed meal options, and encouraged PO intake. Will continue to monitor.     Nutrition Risk Screen  Have you recently lost weight without trying?: Yes: 34 lbs or more  Have you been eating poorly because of a decreased  appetite?: Yes    Typical Food/Fluid Intake: Boost/Ensure TID    H&P 3/7, filed 3/14          Nutrition progress notes 3/17     AND / ASPEN Clinical Characteristics (October 2011)  A minimum of two characteristics is recommended for diagnosing either moderate or severe malnutrition   Mild Malnutrition Moderate Malnutrition Severe Malnutrition   Energy Intake from p.o., TF or TPN. < 75% intake of estimated energy needs for less than 7 days < 75% intake of estimated energy needs for greater than 7 days < 50% intake of estimated energy needs for > 5 days   Weight Loss 1-2% in 1 month  5% in 3 months  7.5% in 6 months  10% in 1 year 1-2 % in 1 week  5% in 1 month  7.5% in 3 months  10% in 6 months  20% in 1 year > 2% in 1 week  > 5% in 1 month  > 7.5% in 3 months  > 10% in 6 months  > 20% in 1 year   Physical Findings     None *Mild subcutaneous fat and/or muscle loss  *Mild fluid accumulation  *Stage II decubitus  *Surgical wound or non-healing wound *Mod/severe subcutaneous fat and/or muscle loss  *Mod/severe fluid accumulation  *Stage III or IV decubitus  *Non-healing surgical wound     Provider, please specify degree of malnutrition:     [  ] Mild Protein-Calorie Malnutrition   [  ] Moderate Protein-Calorie Malnutrition   [  x] Severe Protein-Calorie Malnutrition   [  ] Other  (please specify): _______   [  ] Clinically Undetermined       Please document in your progress notes daily for the duration of treatment until resolved and include in your discharge summary.

## 2022-03-21 NOTE — PROGRESS NOTES
F/U visit with Mr. Tipton. One piece flat surgical AMANDA pouch noted intact to RUQ Ileostomy, no leak noted. Thin liquid green stool again noted in pouch, Still with high output - 1725mL over past 24 hours; so far only 250 this shift (since 0700). Son at bedside. Previous ostomy education reviewed with patient and son. Extra surgical AMANDA pouches left at bedside in case needed. Will plan to change pouch tomorrow.   Heart Failure/Rivaroxaban/Xarelto

## 2022-03-21 NOTE — SUBJECTIVE & OBJECTIVE
Interval History: POD 6. Does not complain of pain. Reports some ambulation and minimal oral intake. 1.7 L ostomy output yesterday. Denies fevers, chills, nausea, and vomiting. Minimal KIRK drain output.     Medications:  Continuous Infusions:   dextrose 5 % and 0.9 % NaCl with KCl 20 mEq 100 mL/hr at 03/21/22 0728     Scheduled Meds:   acetaminophen  650 mg Oral Q6H    amoxicillin-clavulanate 875-125mg  1 tablet Oral Q12H    loperamide  2 mg Oral TID     PRN Meds:albuterol sulfate, calcium carbonate, melatonin, naloxone, ondansetron, sodium chloride 0.9%, sodium chloride 0.9%     Review of patient's allergies indicates:  No Known Allergies  Objective:     Vital Signs (Most Recent):  Temp: 97.7 °F (36.5 °C) (03/21/22 0428)  Pulse: 67 (03/21/22 0428)  Resp: 17 (03/21/22 0428)  BP: (!) 125/58 (03/21/22 0428)  SpO2: 97 % (03/21/22 0428)   Vital Signs (24h Range):  Temp:  [97.7 °F (36.5 °C)-98.6 °F (37 °C)] 97.7 °F (36.5 °C)  Pulse:  [67-76] 67  Resp:  [17-18] 17  SpO2:  [96 %-99 %] 97 %  BP: (108-126)/(57-61) 125/58     Weight: 65.9 kg (145 lb 4.5 oz)  Body mass index is 16.79 kg/m².    Intake/Output - Last 3 Shifts         03/19 0700  03/20 0659 03/20 0700 03/21 0659 03/21 0700  03/22 0659    P.O. 660 120     I.V. (mL/kg)   1805.1 (27.4)    IV Piggyback       Total Intake(mL/kg) 660 (10) 120 (1.8) 1805.1 (27.4)    Urine (mL/kg/hr) 525 (0.3) 700 (0.4)     Drains 285 55     Stool 1915 1725     Total Output 2725 2480     Net -2065 -2360 +1805.1           Urine Occurrence 2 x 1 x             Physical Exam  Vitals and nursing note reviewed.   Constitutional:       General: He is not in acute distress.     Appearance: He is well-developed. He is not ill-appearing.   HENT:      Head: Normocephalic and atraumatic.      Right Ear: External ear normal.      Left Ear: External ear normal.   Eyes:      Extraocular Movements: Extraocular movements intact.      Conjunctiva/sclera: Conjunctivae normal.   Cardiovascular:      Rate and  Rhythm: Normal rate.   Pulmonary:      Effort: Pulmonary effort is normal. No respiratory distress.   Abdominal:      General: There is no distension.      Palpations: Abdomen is soft.      Tenderness: There is no abdominal tenderness.      Comments: Soft, non-distended abdomen. Midline surgical site with staples in place, clean and dry without signs of infection. KIRK drain in place with minimal serous output. Ileostomy with mostly liquid stool output.    Musculoskeletal:      Cervical back: Normal range of motion and neck supple.   Skin:     General: Skin is warm and dry.   Neurological:      General: No focal deficit present.      Mental Status: He is alert.   Psychiatric:         Behavior: Behavior normal.       Significant Labs:  I have reviewed all pertinent lab results within the past 24 hours.  CBC:   Recent Labs   Lab 03/21/22  0548   WBC 12.92*  12.92*   RBC 3.75*  3.75*   HGB 10.3*  10.3*   HCT 32.8*  32.8*   *  517*   MCV 88  88   MCH 27.5  27.5   MCHC 31.4*  31.4*     CMP:   Recent Labs   Lab 03/17/22  0911 03/18/22  1238 03/21/22  0548   GLU 89   < > 98  98   CALCIUM 8.3*   < > 8.3*  8.3*   ALBUMIN 2.1*  --   --    PROT 5.4*  --   --    *   < > 134*  134*   K 4.0   < > 4.8  4.8   CO2 27   < > 18*  18*      < > 107  107   BUN 19   < > 8  8   CREATININE 0.6   < > 0.5  0.5   ALKPHOS 68  --   --    ALT 10  --   --    AST 17  --   --    BILITOT 1.0  --   --     < > = values in this interval not displayed.       Significant Diagnostics:  I have reviewed all pertinent imaging results/findings within the past 24 hours.  No new pertinent imaging.

## 2022-03-22 LAB
ANION GAP SERPL CALC-SCNC: 9 MMOL/L (ref 8–16)
BASOPHILS # BLD AUTO: 0.12 K/UL (ref 0–0.2)
BASOPHILS NFR BLD: 1 % (ref 0–1.9)
BUN SERPL-MCNC: 11 MG/DL (ref 8–23)
CALCIUM SERPL-MCNC: 8.2 MG/DL (ref 8.7–10.5)
CHLORIDE SERPL-SCNC: 102 MMOL/L (ref 95–110)
CO2 SERPL-SCNC: 19 MMOL/L (ref 23–29)
CREAT SERPL-MCNC: 0.5 MG/DL (ref 0.5–1.4)
DIFFERENTIAL METHOD: ABNORMAL
EOSINOPHIL # BLD AUTO: 0.9 K/UL (ref 0–0.5)
EOSINOPHIL NFR BLD: 6.9 % (ref 0–8)
ERYTHROCYTE [DISTWIDTH] IN BLOOD BY AUTOMATED COUNT: 14.6 % (ref 11.5–14.5)
EST. GFR  (AFRICAN AMERICAN): >60 ML/MIN/1.73 M^2
EST. GFR  (NON AFRICAN AMERICAN): >60 ML/MIN/1.73 M^2
GLUCOSE SERPL-MCNC: 89 MG/DL (ref 70–110)
HCT VFR BLD AUTO: 33.1 % (ref 40–54)
HGB BLD-MCNC: 10.4 G/DL (ref 14–18)
IMM GRANULOCYTES # BLD AUTO: 0.49 K/UL (ref 0–0.04)
IMM GRANULOCYTES NFR BLD AUTO: 3.9 % (ref 0–0.5)
LYMPHOCYTES # BLD AUTO: 1.3 K/UL (ref 1–4.8)
LYMPHOCYTES NFR BLD: 10.3 % (ref 18–48)
MCH RBC QN AUTO: 28 PG (ref 27–31)
MCHC RBC AUTO-ENTMCNC: 31.4 G/DL (ref 32–36)
MCV RBC AUTO: 89 FL (ref 82–98)
MONOCYTES # BLD AUTO: 1.3 K/UL (ref 0.3–1)
MONOCYTES NFR BLD: 10.5 % (ref 4–15)
NEUTROPHILS # BLD AUTO: 8.4 K/UL (ref 1.8–7.7)
NEUTROPHILS NFR BLD: 67.4 % (ref 38–73)
NRBC BLD-RTO: 0 /100 WBC
PLATELET # BLD AUTO: 536 K/UL (ref 150–450)
PMV BLD AUTO: 9.5 FL (ref 9.2–12.9)
POTASSIUM SERPL-SCNC: 4 MMOL/L (ref 3.5–5.1)
RBC # BLD AUTO: 3.71 M/UL (ref 4.6–6.2)
SODIUM SERPL-SCNC: 130 MMOL/L (ref 136–145)
WBC # BLD AUTO: 12.44 K/UL (ref 3.9–12.7)

## 2022-03-22 PROCEDURE — 85025 COMPLETE CBC W/AUTO DIFF WBC: CPT | Performed by: COLON & RECTAL SURGERY

## 2022-03-22 PROCEDURE — 25000003 PHARM REV CODE 250: Performed by: COLON & RECTAL SURGERY

## 2022-03-22 PROCEDURE — 25000003 PHARM REV CODE 250

## 2022-03-22 PROCEDURE — 97116 GAIT TRAINING THERAPY: CPT

## 2022-03-22 PROCEDURE — 97530 THERAPEUTIC ACTIVITIES: CPT

## 2022-03-22 PROCEDURE — 11000001 HC ACUTE MED/SURG PRIVATE ROOM

## 2022-03-22 PROCEDURE — 97110 THERAPEUTIC EXERCISES: CPT

## 2022-03-22 PROCEDURE — 36415 COLL VENOUS BLD VENIPUNCTURE: CPT | Performed by: COLON & RECTAL SURGERY

## 2022-03-22 PROCEDURE — 80048 BASIC METABOLIC PNL TOTAL CA: CPT | Performed by: COLON & RECTAL SURGERY

## 2022-03-22 RX ADMIN — AMOXICILLIN AND CLAVULANATE POTASSIUM 1 TABLET: 875; 125 TABLET, FILM COATED ORAL at 08:03

## 2022-03-22 RX ADMIN — LOPERAMIDE HYDROCHLORIDE 4 MG: 2 CAPSULE ORAL at 09:03

## 2022-03-22 RX ADMIN — SODIUM CHLORIDE: 0.45 INJECTION, SOLUTION INTRAVENOUS at 05:03

## 2022-03-22 RX ADMIN — ACETAMINOPHEN 650 MG: 325 TABLET ORAL at 05:03

## 2022-03-22 RX ADMIN — LOPERAMIDE HYDROCHLORIDE 4 MG: 2 CAPSULE ORAL at 08:03

## 2022-03-22 RX ADMIN — ACETAMINOPHEN 650 MG: 325 TABLET ORAL at 01:03

## 2022-03-22 RX ADMIN — LOPERAMIDE HYDROCHLORIDE 4 MG: 2 CAPSULE ORAL at 05:03

## 2022-03-22 RX ADMIN — LOPERAMIDE HYDROCHLORIDE 4 MG: 2 CAPSULE ORAL at 01:03

## 2022-03-22 RX ADMIN — ACETAMINOPHEN 650 MG: 325 TABLET ORAL at 12:03

## 2022-03-22 RX ADMIN — AMOXICILLIN AND CLAVULANATE POTASSIUM 1 TABLET: 875; 125 TABLET, FILM COATED ORAL at 09:03

## 2022-03-22 NOTE — ASSESSMENT & PLAN NOTE
80yo M with R colon adenocarcinoma now s/p open RHC with end ileostomy on 3/15/22    - Reg diet  - HLIV  - PO augmentin given stool spillage in OR and postop leukocytosis  - monitor ostomy output given 1.4 L output yesterday. Continue imodium, dose was increased yesterday.  - PO pain control  - WCON c/s for stoma education, care and supplies  - IS 10xs/hr while awake  - OOB, ambulation encouraged. PT/OT on board.  - will eventually DC home on hospice (was on it preop, so will resume), however ileostomy output needs to be better controlled

## 2022-03-22 NOTE — PT/OT/SLP PROGRESS
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   PT AGREED TO TX " ILL TRY"    Pain/Comfort  Pain Rating 1: 5/10  Location 1: abdomen  Pain Rating Post-Intervention 1: 5/10    Objective:   Patient found with: peripheral IV, KIRK drain, colostomy    Functional Mobility:  PT MET IN RM SUP>SIT EOB WITH SBA AND HOB ELEVATED PT SCOOTED WITH SBA. PT STOOD WITH RW AND MIN A. PT GT TRAINED X 40' WITH STEP TO GT WITH B FOOT DROP. PT RETURNED TO RM AND T/F TO CHAIR WITH MIN A AND VERBAL CUES FOR SAFETY WITH T/F AND RW USE. PT SEATED IN CHAIR FOR B LE AP, TKE, AND MIP. PT LEFT SEATED IN CHAIR WITH ALL NEEDS MET AND FAMILY PRESENT.     AM-PAC 6 CLICK MOBILITY  How much help from another person does this patient currently need?   1 = Unable, Total/Dependent Assistance  2 = A lot, Maximum/Moderate Assistance  3 = A little, Minimum/Contact Guard/Supervision  4 = None, Modified [place]/Independent    Turning over in bed (including adjusting bedclothes, sheets and blankets)?: 4  Sitting down on and standing up from a chair with arms (e.g., wheelchair, bedside commode, etc.): 3  Moving from lying on back to sitting on the side of the bed?: 4  Moving to and from a bed to a chair (including a wheelchair)?: 3  Need to walk in hospital room?: 3  Climbing 3-5 steps with a railing?: 1  Basic Mobility Total Score: 18    AM-PAC Raw Score CMS G-Code Modifier Level of Impairment Assistance   6 % Total / Unable   7 - 9 CM " 80 - 100% Maximal Assist   10 - 14 CL 60 - 80% Moderate Assist   15 - 19 CK 40 - 60% Moderate Assist   20 - 22 CJ 20 - 40% Minimal Assist   23 CI 1-20% SBA / CGA   24 CH 0% Independent/ Mod I     Patient left up in chair with call button in reach.    Assessment:  PT FLORESITA TX WELL.    Rehab identified problem list/impairments: Rehab identified problem list/impairments: weakness, impaired endurance, impaired self care skills, impaired functional mobilty, gait instability, impaired balance, decreased ROM, decreased lower extremity function, decreased safety awareness, pain, decreased upper extremity function    Rehab potential is fair.    Activity tolerance: Fair    Discharge recommendations: Discharge Facility/Level of Care Needs: home health PT     Barriers to discharge:      Equipment recommendations: Equipment Needed After Discharge: none     GOALS:   Multidisciplinary Problems     Physical Therapy Goals        Problem: Physical Therapy Goal    Goal Priority Disciplines Outcome Goal Variances Interventions   Physical Therapy Goal     PT, PT/OT Ongoing, Progressing     Description: LTG: 3/31/22  1. PT SUP>SIT EOB WITH SBA  2. PT WILL T/F TO CHAIR WITH RW AND CGA  3. PT WILL GT TRAIN X 80' WITH RW AND CGA                   PLAN:    Patient to be seen 3 x/week  to address the above listed problems via gait training, therapeutic activities, therapeutic exercises  Plan of Care expires: 03/31/22  Plan of Care reviewed with: patient, son         03/22/2022

## 2022-03-22 NOTE — PT/OT/SLP PROGRESS
Occupational Therapy  Treatment    Jag Tipton   MRN: [id number]   Admitting Diagnosis: Malignant neoplasm of ascending colon    OT Date of Treatment: 03/22/22   OT Start Time: 0850  OT Stop Time: 0913  OT Total Time (min): 23 min    Billable Minutes:  Therapeutic Activity 13 MINUTES and Therapeutic Exercise 10 MINUTES    OT/JODI: OT          General Precautions: Standard, fall  Orthopedic Precautions: N/A  Braces:      Subjective:  Communicated with  WITH NURSING  AND Epic CHART REVIEW prior to session.  Pain/Comfort  Pain Rating 1: 5/10  Location - Orientation 1: generalized  Location 1: abdomen    Objective:  Patient found with: peripheral IV, colostomy, KIRK drain     Functional Mobility:  Bed Mobility:  CGA/ SBA WITH ROLLING L<R    Transfers:   MIN A  WITH SIT<>STAND     Functional Ambulation:   PT AMBULATED 40 FEET WITH MIN A    Activities of Daily Living:   LE MAX A WITH AUBREY BRIEF   Balance:   Static Sit: FAIR+: Able to take MINIMAL challenges from all directions  Dynamic Sit: FAIR+: Maintains balance through MINIMAL excursions of active trunk motion  Static Stand: FAIR: Maintains without assist but unable to take challenges  Dynamic stand: POOR+: Needs MIN (minimal ) assist during gait    Therapeutic Activities and Exercises:  PT EDUCATED ON HEP. PT VERBALIZED AND RETURNED DEMONSTRATION. PT PERFORMED 1 SET X 10 REPS B UE ROM EXERCISE (SHOULDER FLEXION,. ELBOW FLEX/EXT, HAND / DIGITS FLEX/ EXT)     AM-PAC 6 CLICK ADL   How much help from another person does this patient currently need?   1 = Unable, Total/Dependent Assistance  2 = A lot, Maximum/Moderate Assistance  3 = A little, Minimum/Contact Guard/Supervision  4 = None, Modified [place]/Independent    Putting on and taking off regular lower body clothing? : 2  Bathing (including washing, rinsing, drying)?: 2  Toileting, which includes using toilet, bedpan, or urinal? : 2  Putting on and taking off regular upper body clothing?: 3  Taking care of  "personal grooming such as brushing teeth?: 3  Eating meals?: 4  Daily Activity Total Score: 16     AM-PAC Raw Score CMS "G-Code Modifier Level of Impairment Assistance   6 % Total / Unable   7 - 8 CM 80 - 100% Maximal Assist   9-13 CL 60 - 80% Moderate Assist   14 - 19 CK 40 - 60% Moderate Assist   20 - 22 CJ 20 - 40% Minimal Assist   23 CI 1-20% SBA / CGA   24 CH 0% Independent/ Mod I       Patient left up in chair with all lines intact, call button in reach, NURSE  notified and SON present    ASSESSMENT:  Jag Tipton is a 81 y.o. male with a medical diagnosis of Malignant neoplasm of ascending colon and presents with DEBILITY AND GENERALIZED WEAKNESS.    Rehab identified problem list/impairments: Rehab identified problem list/impairments: weakness, impaired balance, decreased safety awareness, impaired endurance, impaired self care skills, impaired functional mobilty, gait instability, decreased lower extremity function, decreased upper extremity function, decreased ROM    Rehab potential is good.    Activity tolerance: Good    Discharge recommendations: Discharge Facility/Level of Care Needs: home health OT (WITH 24 HOUR CARE)     Barriers to discharge:     Equipment recommendations: none     GOALS:   Multidisciplinary Problems     Occupational Therapy Goals        Problem: Occupational Therapy Goal    Goal Priority Disciplines Outcome Interventions   Occupational Therapy Goal     OT, PT/OT Ongoing, Progressing    Description: OT GOALS TO BE MET BY 3-31-22  PT WILL TOLERATE 1 SET X 10 REPS B UE ROM EXERCISE  SBA WITH LE DRESSING  S WITH UE DRESSING  SBA WITH TOILET T/F'S                   Plan:  Patient to be seen 2 x/week to address the above listed problems via self-care/home management, therapeutic exercises, therapeutic activities  Plan of Care expires:    Plan of Care reviewed with: patient, son         03/22/2022  "

## 2022-03-22 NOTE — PROGRESS NOTES
[facility] Surg  Colorectal Surgery  Progress Note    Subjective:     History of Present Illness:  82yo M with R colon adenocarcinoma who presents for definitive surgical management      Post-Op Info:  Procedure(s) (LRB):  COLECTOMY, RIGHT OPEN WITH END ILEOSTOMY CONSTRUCTION (N/A)  BIOPSY, LIVER (N/A)  BIOPSY, PERITONEUM (N/A)  BIOPSY, ABDOMINAL WALL (N/A)  BLOCK, TRANSVERSUS ABDOMINIS PLANE (N/A)  HEMICOLECTOMY, RIGHT (Right)   7 Days Post-Op     Interval History: POD 7. Denies pain. Minimal oral intake. 1.4 L output yesterday. Imodium increased to QID yesterday.    Medications:  Continuous Infusions:   sodium chloride 0.45% 100 mL/hr at 03/22/22 0505     Scheduled Meds:   acetaminophen  650 mg Oral Q6H    amoxicillin-clavulanate 875-125mg  1 tablet Oral Q12H    loperamide  4 mg Oral QID     PRN Meds:albuterol sulfate, calcium carbonate, melatonin, naloxone, ondansetron, sodium chloride 0.9%, sodium chloride 0.9%     Review of patient's allergies indicates:  No Known Allergies  Objective:     Vital Signs (Most Recent):  Temp: 98.4 °F (36.9 °C) (03/22/22 0719)  Pulse: 65 (03/22/22 0719)  Resp: 14 (03/22/22 0719)  BP: (!) 114/56 (03/22/22 0719)  SpO2: 96 % (03/22/22 0719)   Vital Signs (24h Range):  Temp:  [97.8 °F (36.6 °C)-98.8 °F (37.1 °C)] 98.4 °F (36.9 °C)  Pulse:  [65-76] 65  Resp:  [12-19] 14  SpO2:  [95 %-99 %] 96 %  BP: (110-133)/(56-63) 114/56     Weight: 63.6 kg (140 lb 3.4 oz)  Body mass index is 16.2 kg/m².    Intake/Output - Last 3 Shifts         03/20 0700  03/21 0659 03/21 0700 03/22 0659 03/22 0700 03/23 0659    P.O. 120 120 240    I.V. (mL/kg)  4007.1 (63)     Total Intake(mL/kg) 120 (1.8) 4127.1 (64.9) 240 (3.8)    Urine (mL/kg/hr) 700 (0.4) 600 (0.4)     Drains 55 40     Stool 1725 1400     Total Output 2480 2040     Net -2360 +2087.1 +240           Urine Occurrence 1 x              Physical Exam  Vitals reviewed.   Constitutional:       Appearance: He is well-developed.   HENT:       Head: Normocephalic and atraumatic.   Cardiovascular:      Rate and Rhythm: Normal rate and regular rhythm.   Pulmonary:      Effort: Pulmonary effort is normal. No respiratory distress.   Abdominal:      General: There is no distension.      Palpations: Abdomen is soft.      Tenderness: There is no abdominal tenderness.      Comments: Midline abdominal incision clean and dry without signs of infection. Soft, non-distended. KIRK drain with minimal serous drainage. Ostomy with mostly liquid output.    Musculoskeletal:      Cervical back: Normal range of motion and neck supple.   Skin:     General: Skin is warm and dry.   Neurological:      Mental Status: He is alert and oriented to person, place, and time.   Psychiatric:         Behavior: Behavior normal.       Significant Labs:  I have reviewed all pertinent lab results within the past 24 hours.  CBC:   Recent Labs   Lab 03/22/22  0548   WBC 12.44   RBC 3.71*   HGB 10.4*   HCT 33.1*   *   MCV 89   MCH 28.0   MCHC 31.4*     CMP:   Recent Labs   Lab 03/17/22  0911 03/18/22  1238 03/22/22  0548   GLU 89   < > 89   CALCIUM 8.3*   < > 8.2*   ALBUMIN 2.1*  --   --    PROT 5.4*  --   --    *   < > 130*   K 4.0   < > 4.0   CO2 27   < > 19*      < > 102   BUN 19   < > 11   CREATININE 0.6   < > 0.5   ALKPHOS 68  --   --    ALT 10  --   --    AST 17  --   --    BILITOT 1.0  --   --     < > = values in this interval not displayed.       Significant Diagnostics:  I have reviewed all pertinent imaging results/findings within the past 24 hours.  No new significant imaging.    Assessment/Plan:     * Malignant neoplasm of ascending colon  82yo M with R colon adenocarcinoma now s/p open RHC with end ileostomy on 3/15/22    - Reg diet  - HLIV  - PO augmentin given stool spillage in OR and postop leukocytosis  - monitor ostomy output given 1.4 L output yesterday. Continue imodium, dose was increased yesterday.  - PO pain control  - WCON c/s for stoma education, care  and supplies  - IS 10xs/hr while awake  - OOB, ambulation encouraged. PT/OT on board.  - will eventually DC home on hospice (was on it preop, so will resume), however ileostomy output needs to be better controlled        Em Hare PA-C  Colorectal Surgery  [facility] - Med S[facility]

## 2022-03-22 NOTE — SUBJECTIVE & OBJECTIVE
Interval History: POD 7. Denies pain. Minimal oral intake. 1.4 L output yesterday. Imodium increased to QID yesterday.    Medications:  Continuous Infusions:   sodium chloride 0.45% 100 mL/hr at 03/22/22 0505     Scheduled Meds:   acetaminophen  650 mg Oral Q6H    amoxicillin-clavulanate 875-125mg  1 tablet Oral Q12H    loperamide  4 mg Oral QID     PRN Meds:albuterol sulfate, calcium carbonate, melatonin, naloxone, ondansetron, sodium chloride 0.9%, sodium chloride 0.9%     Review of patient's allergies indicates:  No Known Allergies  Objective:     Vital Signs (Most Recent):  Temp: 98.4 °F (36.9 °C) (03/22/22 0719)  Pulse: 65 (03/22/22 0719)  Resp: 14 (03/22/22 0719)  BP: (!) 114/56 (03/22/22 0719)  SpO2: 96 % (03/22/22 0719)   Vital Signs (24h Range):  Temp:  [97.8 °F (36.6 °C)-98.8 °F (37.1 °C)] 98.4 °F (36.9 °C)  Pulse:  [65-76] 65  Resp:  [12-19] 14  SpO2:  [95 %-99 %] 96 %  BP: (110-133)/(56-63) 114/56     Weight: 63.6 kg (140 lb 3.4 oz)  Body mass index is 16.2 kg/m².    Intake/Output - Last 3 Shifts         03/20 0700  03/21 0659 03/21 0700 03/22 0659 03/22 0700 03/23 0659    P.O. 120 120 240    I.V. (mL/kg)  4007.1 (63)     Total Intake(mL/kg) 120 (1.8) 4127.1 (64.9) 240 (3.8)    Urine (mL/kg/hr) 700 (0.4) 600 (0.4)     Drains 55 40     Stool 1725 1400     Total Output 2480 2040     Net -2360 +2087.1 +240           Urine Occurrence 1 x              Physical Exam  Vitals reviewed.   Constitutional:       Appearance: He is well-developed.   HENT:      Head: Normocephalic and atraumatic.   Cardiovascular:      Rate and Rhythm: Normal rate and regular rhythm.   Pulmonary:      Effort: Pulmonary effort is normal. No respiratory distress.   Abdominal:      General: There is no distension.      Palpations: Abdomen is soft.      Tenderness: There is no abdominal tenderness.      Comments: Midline abdominal incision clean and dry without signs of infection. Soft, non-distended. KIRK drain with minimal serous  drainage. Ostomy with mostly liquid output.    Musculoskeletal:      Cervical back: Normal range of motion and neck supple.   Skin:     General: Skin is warm and dry.   Neurological:      Mental Status: He is alert and oriented to person, place, and time.   Psychiatric:         Behavior: Behavior normal.       Significant Labs:  I have reviewed all pertinent lab results within the past 24 hours.  CBC:   Recent Labs   Lab 03/22/22  0548   WBC 12.44   RBC 3.71*   HGB 10.4*   HCT 33.1*   *   MCV 89   MCH 28.0   MCHC 31.4*     CMP:   Recent Labs   Lab 03/17/22  0911 03/18/22  1238 03/22/22  0548   GLU 89   < > 89   CALCIUM 8.3*   < > 8.2*   ALBUMIN 2.1*  --   --    PROT 5.4*  --   --    *   < > 130*   K 4.0   < > 4.0   CO2 27   < > 19*      < > 102   BUN 19   < > 11   CREATININE 0.6   < > 0.5   ALKPHOS 68  --   --    ALT 10  --   --    AST 17  --   --    BILITOT 1.0  --   --     < > = values in this interval not displayed.       Significant Diagnostics:  I have reviewed all pertinent imaging results/findings within the past 24 hours.  No new significant imaging.

## 2022-03-22 NOTE — PROGRESS NOTES
F/U visit with Mr. Tipton. He is awake and alert. Mild improvement in amount of Ileostomy output is noted - 1400 over past 24 hours. Small amount watery output noted in pouch, but what is actively coming out is closer to applesauce consistency.   Son reports pouch was changed overnight due to overfilled and leaking. Pouch currently intact, so will not change at this visit. Will plan to change tomorrow if patient remains hospitalized.  Have changed with Daughter in law and 1 son, who will be performing most of at home care. Is currently in surgical pouch with tap outlet, however, with thicker stool coming out, may be able to transition to standard drainable pouch prior to discharge.   Will follow.

## 2022-03-23 VITALS
RESPIRATION RATE: 18 BRPM | TEMPERATURE: 99 F | HEART RATE: 88 BPM | BODY MASS INDEX: 16.45 KG/M2 | OXYGEN SATURATION: 98 % | WEIGHT: 142.19 LBS | SYSTOLIC BLOOD PRESSURE: 113 MMHG | DIASTOLIC BLOOD PRESSURE: 57 MMHG | HEIGHT: 78 IN

## 2022-03-23 LAB
ANION GAP SERPL CALC-SCNC: 9 MMOL/L (ref 8–16)
BASOPHILS # BLD AUTO: 0.08 K/UL (ref 0–0.2)
BASOPHILS NFR BLD: 0.6 % (ref 0–1.9)
BUN SERPL-MCNC: 13 MG/DL (ref 8–23)
CALCIUM SERPL-MCNC: 8.3 MG/DL (ref 8.7–10.5)
CHLORIDE SERPL-SCNC: 101 MMOL/L (ref 95–110)
CO2 SERPL-SCNC: 20 MMOL/L (ref 23–29)
CREAT SERPL-MCNC: 0.5 MG/DL (ref 0.5–1.4)
DIFFERENTIAL METHOD: ABNORMAL
EOSINOPHIL # BLD AUTO: 0.8 K/UL (ref 0–0.5)
EOSINOPHIL NFR BLD: 5.8 % (ref 0–8)
ERYTHROCYTE [DISTWIDTH] IN BLOOD BY AUTOMATED COUNT: 14.6 % (ref 11.5–14.5)
EST. GFR  (AFRICAN AMERICAN): >60 ML/MIN/1.73 M^2
EST. GFR  (NON AFRICAN AMERICAN): >60 ML/MIN/1.73 M^2
GLUCOSE SERPL-MCNC: 97 MG/DL (ref 70–110)
HCT VFR BLD AUTO: 31.9 % (ref 40–54)
HGB BLD-MCNC: 10.1 G/DL (ref 14–18)
IMM GRANULOCYTES # BLD AUTO: 0.52 K/UL (ref 0–0.04)
IMM GRANULOCYTES NFR BLD AUTO: 3.9 % (ref 0–0.5)
LYMPHOCYTES # BLD AUTO: 1.1 K/UL (ref 1–4.8)
LYMPHOCYTES NFR BLD: 8.2 % (ref 18–48)
MCH RBC QN AUTO: 27.5 PG (ref 27–31)
MCHC RBC AUTO-ENTMCNC: 31.7 G/DL (ref 32–36)
MCV RBC AUTO: 87 FL (ref 82–98)
MONOCYTES # BLD AUTO: 1.1 K/UL (ref 0.3–1)
MONOCYTES NFR BLD: 8.4 % (ref 4–15)
NEUTROPHILS # BLD AUTO: 9.9 K/UL (ref 1.8–7.7)
NEUTROPHILS NFR BLD: 73.1 % (ref 38–73)
NRBC BLD-RTO: 0 /100 WBC
PLATELET # BLD AUTO: 499 K/UL (ref 150–450)
PMV BLD AUTO: 9.1 FL (ref 9.2–12.9)
POTASSIUM SERPL-SCNC: 4 MMOL/L (ref 3.5–5.1)
RBC # BLD AUTO: 3.67 M/UL (ref 4.6–6.2)
SODIUM SERPL-SCNC: 130 MMOL/L (ref 136–145)
WBC # BLD AUTO: 13.47 K/UL (ref 3.9–12.7)

## 2022-03-23 PROCEDURE — 99024 PR POST-OP FOLLOW-UP VISIT: ICD-10-PCS | Mod: POP,,, | Performed by: COLON & RECTAL SURGERY

## 2022-03-23 PROCEDURE — 97530 THERAPEUTIC ACTIVITIES: CPT

## 2022-03-23 PROCEDURE — 99024 POSTOP FOLLOW-UP VISIT: CPT | Mod: POP,,, | Performed by: COLON & RECTAL SURGERY

## 2022-03-23 PROCEDURE — 97116 GAIT TRAINING THERAPY: CPT

## 2022-03-23 PROCEDURE — 97110 THERAPEUTIC EXERCISES: CPT

## 2022-03-23 PROCEDURE — 80048 BASIC METABOLIC PNL TOTAL CA: CPT | Performed by: COLON & RECTAL SURGERY

## 2022-03-23 PROCEDURE — 25000003 PHARM REV CODE 250: Performed by: COLON & RECTAL SURGERY

## 2022-03-23 PROCEDURE — 85025 COMPLETE CBC W/AUTO DIFF WBC: CPT | Performed by: COLON & RECTAL SURGERY

## 2022-03-23 PROCEDURE — 36415 COLL VENOUS BLD VENIPUNCTURE: CPT | Performed by: COLON & RECTAL SURGERY

## 2022-03-23 RX ORDER — LOPERAMIDE HYDROCHLORIDE 2 MG/1
4 CAPSULE ORAL 4 TIMES DAILY
Qty: 240 CAPSULE | Refills: 3 | Status: SHIPPED | OUTPATIENT
Start: 2022-03-23 | End: 2022-04-22

## 2022-03-23 RX ADMIN — ACETAMINOPHEN 650 MG: 325 TABLET ORAL at 05:03

## 2022-03-23 RX ADMIN — AMOXICILLIN AND CLAVULANATE POTASSIUM 1 TABLET: 875; 125 TABLET, FILM COATED ORAL at 09:03

## 2022-03-23 RX ADMIN — ACETAMINOPHEN 650 MG: 325 TABLET ORAL at 12:03

## 2022-03-23 RX ADMIN — LOPERAMIDE HYDROCHLORIDE 4 MG: 2 CAPSULE ORAL at 12:03

## 2022-03-23 RX ADMIN — LOPERAMIDE HYDROCHLORIDE 4 MG: 2 CAPSULE ORAL at 09:03

## 2022-03-23 NOTE — PLAN OF CARE
RD Recommendations     1. Diet: regular ; texture: Soft and Bite Sized Foods (IDDSI Level 6)       (monitor tolerance and adjust texture as necessary)      (consider SLP consult if necessary)  2. Nutrition Supplements for optimal calorie and protein intake   - Boost Plus (any flavor) twice daily to provide an additional 720 calories and 28g protein    and    - Boost Pudding (any flavor) twice daily to provide an additional 460 kcal, 14g protein, 64g CHO   3. RD to follow up to monitor intake, tolerance, and labs.    *Please send consult if acute nutrition needs arise.      Jocelyne Lott, MS, RD, LDN

## 2022-03-23 NOTE — PROGRESS NOTES
[facility] - [facility]  Adult Nutrition  Progress Note    SUMMARY     Recommendations     1. Diet: regular ; texture: Soft and Bite Sized Foods (IDDSI Level 6)       (monitor tolerance and adjust texture as necessary)      (consider SLP consult if necessary)  2. Nutrition Supplements for optimal calorie and protein intake   - Boost Plus (any flavor) twice daily to provide an additional 720 calories and 28g protein    and    - Boost Pudding (any flavor) twice daily to provide an additional 460 kcal, 14g protein, 64g CHO   3. RD to follow up to monitor intake, tolerance, and labs.    *Please send consult if acute nutrition needs arise.    Goals:  Pt will tolerate >75% estimated energy and protein needs by RD follow up.    Nutrition Goal Status: goal met, continues   Communication of RD recs: POC and sticky note    Assessment and Plan  Nutrition Problem:    Severe Protein-Calorie Malnutrition    in the context of Acute Illness/Injury  Related to (etiology):    Inadequate ability to consume sufficient energy    Inadequate oral intake    Decreased appetite    Increased energy and protein needs    Medical condition  Signs and Symptoms (as evidenced by):    Energy Intake: <75% of estimated energy requirement for  >7 days    Body Fat Depletion: mild, moderate and severe depletion of orbitals, triceps and thoracic and lumbar region     Muscle Mass Depletion: mild, moderate and severe depletion of temples, clavicle region, scapular region, interosseous muscle and lower extremities     Weight Loss: 40 lbs, >7.5% x ~ 3 months    Fluid Accumulation: not indicated  Interventions(treatment strategy):    Enteral nutrition     general, healthful texture modified diet    Nutrition supplement    Collaboration with other care providers  Nutrition Diagnosis Status:   Improving     Reason for Assessment  Reason for assessment: identified at risk by screening criteria   Diagnosis: Malignant neoplasm of ascending colon   Relevant medical  "history:   Past Medical History:   Diagnosis Date    Colon cancer 02/2022    cecal      General information comments:   03/17/2022: RD spoke with pt and 2 sons at bedside. Family reports pt with worsening intake PTA; ~ 40 lb wt loss since January (no weight hx per EMR for comparison); recently began to provide Boost daily and pureed foods which improved intake. Pt has received 2 meals since admit (B&L today) with <50% intake. Agreeable to soft and bite sized foods and Boost daily. I provided a menu, discussed meal options, and encouraged PO intake. Will continue to monitor.     3/23: RD spoke with pt, preparing for d/c. Reports eating some of meals, but "does not like hospital food". Pt reports receiving and drinking Boost supplement, will continue them at home. Pt had no additional questions at this time.     Nutrition Discharge Planning - pending medical course, regular diet; texture as tolerated; ONS daily    Nutrition Risk Screen  Have you recently lost weight without trying?: Yes: 34 lbs or more  Have you been eating poorly because of a decreased appetite?: Yes  Nutrition Risk Screen: dysphagia or difficulty swallowing    Nutrition/Diet History  Patient Reported Diet/Restrictions/Preferences: general   Typical Food/Fluid Intake: Boost/Ensure TID  Food Preferences: recently started on pureed foods to increase intake (agreeable to IDDSI 5 while IP)  Food Allergies: NKFA  Factors Affecting Nutritional Intake: impaired cognitive status/motor control, chewing difficulties and decreased appetite   Spiritual, Cultural Beliefs, Temple Practices, Values that Affect Care: no    Anthropometrics  Temp: 98.9 °F (37.2 °C)  Height Method: Stated  Height: 6' 6" (198.1 cm)  Height (inches): 78 in  Weight Method: Bed Scale  Weight: 64.5 kg (142 lb 3.2 oz)  Weight (lb): 142.2 lb  Ideal Body Weight (IBW), Male: 214 lb  % Ideal Body Weight, Male (lb): 64.39 %  BMI (Calculated): 16.4       Labs  Pertinent Labs: reviewed  Lab " Results   Component Value Date    HGB 10.1 (L) 03/23/2022    HCT 31.9 (L) 03/23/2022     (L) 03/23/2022    CALCIUM 8.3 (L) 03/23/2022    K 4.0 03/23/2022    PHOS 3.0 03/21/2022    BUN 13 03/23/2022    CREATININE 0.5 03/23/2022    ESTGFRAFRICA >60 03/23/2022    EGFRNONAA >60 03/23/2022    ALBUMIN 2.1 (L) 03/17/2022     Meds  Pertinent Medications: reviewed    acetaminophen  650 mg Oral Q6H    loperamide  4 mg Oral QID     Continuous Infusions:    PRN Meds:albuterol sulfate, calcium carbonate, melatonin, naloxone, ondansetron, sodium chloride 0.9%, sodium chloride 0.9%     Physical Findings/Assessment  Nausea/Vomiting Signs/Symptoms:  (no s&s)  Wounds: incision site noted  Edema: 3+ legs  Last Bowel Movement: 03/23/22 Stool Consistency: creamy GI Signs/Symptoms: no gastrointestinal signs/symptoms  Gregorio Score: 20  Mouth/Teeth WDL: WDL except, teeth Teeth Symptoms: tooth/teeth missing  O2 Device (Oxygen Therapy): room air  NFPE performed indicating moderate to severe muscle and fat wasting    Malnutrition Assessment  Malnutrition Type: acute illness or injury  Energy Intake: moderate energy intake          Weight Loss (Malnutrition): greater than 7.5% in 3 months  Subcutaneous Fat (Malnutrition): severe depletion  Muscle Mass (Malnutrition): severe depletion               Subcutaneous Fat Loss (Final Summary): severe protein-calorie malnutrition  Muscle Loss Evaluation (Final Summary): severe protein-calorie malnutrition    Severe Weight Loss (Malnutrition): greater than 7.5% in 3 months          Estimated/Assessed Needs  Weight Used For Calorie Calculations: 71 kg (156 lb 8.4 oz)  Energy Calorie Requirements (kcal): 5244-7214 (25-35, malnourshed, underweight)  Energy Need Method: Kcal/kg  Weight Used For Protein Calculations: 71 kg (156 lb 8.4 oz)  Protein Requirements: 71-106g (malnourished, age)  RDA Method (mL): 1775ml fluid or per MD/NP    Nutrition Prescription Ordered  regular ; Soft and Bite Sized  Foods (IDDSI Level 6)  ; ONS as desired for weight gain    Evaluation of Received Nutrients  Energy Calories Required: meeting needs  Protein Required: meeting needs  Tolerance: tolerating  % Intake of Estimated Energy Needs: 75 - 100 %  % Meal Intake: 25 - 50 %    Monitor and Evaluation  Food and Nutrient Intake: food and beverage intake  Food and Nutrient Administration: diet order  Anthropometric Measurements: weight, weight change, body mass index  Biochemical Data, Medical Tests and Procedures: electrolyte and renal panel, lipid profile, gastrointestinal profile, glucose/endocrine profile, Inflammatory profile  Nutrition-Focused Physical Findings: overall appearance     Nutrition Follow-Up  Level of nutrition risk: high  Frequency of follow-up: Twice weekly   Tentative Next Date to be Seen by RD: 03/25/22  Jocelyne Lott, MS, RD, LDN

## 2022-03-23 NOTE — DISCHARGE SUMMARY
[facility] Surg  Colorectal Surgery  Discharge Summary      Patient Name: Jag Tipton  MRN: [id number]  Admission Date: 3/15/2022  Hospital Length of Stay: 8 days  Discharge Date and Time:  03/23/2022 1:32 PM  Attending Physician: Michael Castro MD   Discharging Provider: Michael Castro MD  Primary Care Provider: Salvador Yanez NP    HPI:   80yo M with R colon adenocarcinoma who presents for definitive surgical management      Procedure(s) (LRB):  COLECTOMY, RIGHT OPEN WITH END ILEOSTOMY CONSTRUCTION (N/A)  BIOPSY, LIVER (N/A)  BIOPSY, PERITONEUM (N/A)  BIOPSY, ABDOMINAL WALL (N/A)  BLOCK, TRANSVERSUS ABDOMINIS PLANE (N/A)  HEMICOLECTOMY, RIGHT (Right)      Indwelling Lines/Drains at time of discharge:   Lines/Drains/Airways     Drain  Duration                Ileostomy 03/15/22 1450 Standard (Viktoria, end) RUQ 7 days              Hospital Course: 03/15/2022: Underwent open right hemicolectomy with end ileostomy construction    03/16/2022: POD 1. Pain well controlled. Having small amount of ileostomy function. Denies nausea or vomiting. Has not ambulated postop.    03/17/2022: POD 2. Pain well controlled. Having more significant ileostomy function. Denies nausea or vomiting. Minimal ambulation still.    03/18/2022: POD 3. Pain well controlled. Having high ileostomy output (>1L per day). No nausea or vomiting. Tolerating diet well.    3/19/2022 POD4 patient without complaints of pain.  Tolerating p.o..  High ileostomy output over 24 hours.  1200 mL yesterday.  Much decreased today.  Was started on Imodium Friday evening.  Denies any nausea vomiting.  Incision looks good without signs of any infection or dehiscence.  Will observe patient today.  If output is under 1000 ml then anticipate discharge tomorrow.    03/20/2022.  Postop day 5. Does not complain of pain.  Has high ileostomy output with approximately 1.9 L yesterday.  He was started on Imodium on Friday.  He denies any nausea vomiting.  He has some mild  hyponatremia.  Will start some IV fluids and monitor ileostomy output    03/21/2022: POD 6. Does not complain of pain. Reports some ambulation and minimal oral intake. 1.7 L ostomy output yesterday. Denies fevers, chills, nausea, and vomiting.     03/22/2022: POD 7. Denies pain. Minimal oral intake. 1.4 L output yesterday. Imodium increased to QID yesterday.    03/23/2022: POD 8. Pain well controlled. 750mL ostomy output, which is thicker. Tolerating diet. Ready for discharge home to resume hospice.      Goals of Care Treatment Preferences:  Code Status: Full Code      Consults:     Significant Diagnostic Studies: Labs:   BMP:   Recent Labs   Lab 03/22/22  0548 03/23/22  0550   GLU 89 97   * 130*   K 4.0 4.0    101   CO2 19* 20*   BUN 11 13   CREATININE 0.5 0.5   CALCIUM 8.2* 8.3*   , CMP   Recent Labs   Lab 03/22/22  0548 03/23/22  0550   * 130*   K 4.0 4.0    101   CO2 19* 20*   GLU 89 97   BUN 11 13   CREATININE 0.5 0.5   CALCIUM 8.2* 8.3*   ANIONGAP 9 9   ESTGFRAFRICA >60 >60   EGFRNONAA >60 >60    and CBC   Recent Labs   Lab 03/22/22  0548 03/23/22  0550   WBC 12.44 13.47*   HGB 10.4* 10.1*   HCT 33.1* 31.9*   * 499*       Pending Diagnostic Studies:     Procedure Component Value Units Date/Time    Specimen to Pathology, Surgery General Surgery [[id number]] Collected: 03/15/22 1453    Order Status: Sent Lab Status: In process Updated: 03/16/22 0739        Final Active Diagnoses:    Diagnosis Date Noted POA    PRINCIPAL PROBLEM:  Malignant neoplasm of ascending colon [C18.2] 03/15/2022 Yes    Severe malnutrition [E43] 03/17/2022 Yes      Problems Resolved During this Admission:      Discharged Condition: good    Disposition: Hospice/Home    Follow Up:   Follow-up Information     Michael Castro MD Follow up in 2 week(s).    Specialties: Colon and Rectal Surgery, General Surgery  Why: staple removal, postop check  Contact information:  [id number] [place]  "[id number]  [phone]                       Patient Instructions:      OSTOMY SUPPLIES FOR HOME USE   Order Comments: Coloplast:   Sensura Amanda Soft Convex one piece pouch #[id number]   Sensura AMANDA high output light convex one piece pouch #[id number]   Brava Skin Barrier Spray #[id number]   Ostomy Scissors #[id number]   Brava adhesive remover spray #[id number]   Convatec:   Hilario Paste # [id number]     Order Specific Question Answer Comments   Height: 6' 6" (1.981 m)    Weight: 66.8 kg (147 lb 4.3 oz)    Length of need (1-99 months): 12    Diagnosis Ileostomy    Does patient have medical equipment at home? walker, rolling    Does patient have medical equipment at home? wheelchair    Does patient have medical equipment at home? rollator    Does patient have medical equipment at home? cane, straight      Ambulatory referral/consult to Hospice   Standing Status: Future   Referral Priority: Routine Referral Type: Consultation   Referral Reason: Specialty Services Required   Requested Specialty: Hospice and Palliative Medicine   Number of Visits Requested: 1     Diet Adult Regular     Lifting restrictions   Order Comments: No lifting greater than 10 lb for 6 weeks after surgery     Other restrictions (specify):   Order Comments: Showers only for 3 weeks.  No baths or submerging incisions underneath water.     Notify your health care provider if you experience any of the following:  temperature >100.4     Notify your health care provider if you experience any of the following:  increased confusion or weakness     Notify your health care provider if you experience any of the following:  persistent dizziness, light-headedness, or visual disturbances     Notify your health care provider if you experience any of the following:  worsening rash     Notify your health care provider if you experience any of the following:  severe persistent headache     Notify your health care provider if you experience any of the following:  difficulty breathing or increased " cough     Notify your health care provider if you experience any of the following:  redness, tenderness, or signs of infection (pain, swelling, redness, odor or green/yellow discharge around incision site)     Notify your health care provider if you experience any of the following:  severe uncontrolled pain     Notify your health care provider if you experience any of the following:  persistent nausea and vomiting or diarrhea     No dressing needed     Activity as tolerated     Medications:  Reconciled Home Medications:      Medication List      START taking these medications    loperamide 2 mg capsule  Commonly known as: IMODIUM  Take 2 capsules (4 mg total) by mouth 4 (four) times daily.        CONTINUE taking these medications    fentaNYL 100 mcg/hr  Commonly known as: DURAGESIC  Place 1 patch onto the skin every 72 hours.     furosemide 20 MG tablet  Commonly known as: LASIX  Take 20 mg by mouth 2 (two) times daily.     ibuprofen 600 MG tablet  Commonly known as: ADVIL,MOTRIN  Take 600 mg by mouth every 6 (six) hours as needed for Pain.     metroNIDAZOLE 500 MG tablet  Commonly known as: FLAGYL  Take 1 tablet (500 mg total) by mouth 3 (three) times daily. Take initial dose@2pm, second dose@3pm and final dose@10pm day before surgery     neomycin 500 mg Tab  Commonly known as: MYCIFRADIN  Take 2 tablets (1,000 mg total) by mouth 3 (three) times daily. Take 1st dose@2pm,take 2nd dose@3pm, take last dose@10pm day before surgery     oxyCODONE-acetaminophen  mg per tablet  Commonly known as: PERCOCET  Take 10 tablets by mouth as needed.     polyethylene glycol 17 gram/dose powder  Commonly known as: GLYCOLAX  Take 238 g by mouth once daily. Mix with 2L of gatorade, drink all mixed solution starting@12pm day before surgery, finish by 10pm     traZODone 50 MG tablet  Commonly known as: DESYREL  Take 50 mg by mouth every evening.          Time spent on the discharge of patient: 35 minutes    Michael Castro,  MD  Colorectal Surgery  [facility]

## 2022-03-23 NOTE — PLAN OF CARE
Problem: Adult Inpatient Plan of Care  Goal: Plan of Care Review  Outcome: Ongoing, Progressing  Goal: Patient-Specific Goal (Individualized)  Outcome: Ongoing, Progressing  Goal: Absence of Hospital-Acquired Illness or Injury  Outcome: Ongoing, Progressing  Goal: Optimal Comfort and Wellbeing  Outcome: Ongoing, Progressing  Goal: Readiness for Transition of Care  Outcome: Ongoing, Progressing     Problem: Infection  Goal: Absence of Infection Signs and Symptoms  Outcome: Ongoing, Progressing     Problem: Fall Injury Risk  Goal: Absence of Fall and Fall-Related Injury  Outcome: Ongoing, Progressing     Problem: Pain Acute  Goal: Acceptable Pain Control and Functional Ability  Outcome: Ongoing, Progressing

## 2022-03-23 NOTE — PROGRESS NOTES
F/U visit with Mr. Tipton. Ileostomy output 750 over past 24 hours. Emptied 150mL thick liquid brown stool (applesauce consistency) at this time. Prior ostomy education reviewed with patient and son at bedside. With coaching and min assistance, patient performed pouch change, applying one piece flat AMANDA drainable pouch. Stoma moist and pink, measures 35mm. Mild peristomal erythema noted. cavilon skin barrier film applied and swetha paste utilized for pouch change. Tolerated care well.  DIL and 2 sons have been educated in addition to patient. He will be discharged with home hospice care for ongoing ostomy needs. Provided with box of AMANDA pouches, paste, cavilon spray for dischage.

## 2022-03-23 NOTE — PLAN OF CARE
Lui received a call from Isa with [facility] at [phone], regarding patients anticipated dc date. Lui informed that DC was currently contingent upon patients ostomy output. Per isa, agency will need at least 3 hours notice prior to patient discharging in order to get equipment and supplies setup for him. Lui informed that I would reach out once DC orders are received to allow time for setup.

## 2022-03-23 NOTE — PT/OT/SLP PROGRESS
Physical Therapy  Treatment    Jag Tipton   MRN: [id number]   Admitting Diagnosis: Malignant neoplasm of ascending colon    PT Received On: 03/23/22  PT Start Time: 0735     PT Stop Time: 0800    PT Total Time (min): 25 min       Billable Minutes:  Gait Training 15 and Therapeutic Exercise 10    Treatment Type: Treatment  PT/PTA: PT     PTA Visit Number: 0       General Precautions: Standard, fall  Orthopedic Precautions: N/A   Braces: N/A  Respiratory Status: Room air         Subjective:  Communicated with NURSE ELROY AND Epic CHART REVIEW  prior to session.   PT AGREED TO TX     Pain/Comfort  Pain Rating 1: 2/10  Location - Side 1: Right  Location 1: rib(s)  Pain Rating Post-Intervention 1: 2/10    Objective:   Patient found with: peripheral IV, colostomy    Functional Mobility:  PT MET IN RM SUP IN BED. PT SUP>SIT EOB WITH S. PT SCOOTED TO EOB AND STOOD WITH RW AND CGA. P.T. DONNED NEW DIAPER. PT  GT TRAINING WITH RW AND MIN A WITH B FOOT DROP AND SLOW PACE GT X 40'. PT RETURNED TO RM T/F TO CHAIR WITH RW AND MIN A AND INC CUES FOR HAND PLACEMENT FOR SAFETY. PT SEATED FOR B LE TE X 10 REPS OF ANKLE PF, TKE AND MIP FOR LE ROM AND STRENGTHENING. PT LEFT SEATED WITH ALL NEEDS MET     AM-PAC 6 CLICK MOBILITY  How much help from another person does this patient currently need?   1 = Unable, Total/Dependent Assistance  2 = A lot, Maximum/Moderate Assistance  3 = A little, Minimum/Contact Guard/Supervision  4 = None, Modified [place]/Independent    Turning over in bed (including adjusting bedclothes, sheets and blankets)?: 4  Sitting down on and standing up from a chair with arms (e.g., wheelchair, bedside commode, etc.): 3  Moving from lying on back to sitting on the side of the bed?: 4  Moving to and from a bed to a chair (including a wheelchair)?: 3  Need to walk in hospital room?: 3  Climbing 3-5 steps with a railing?: 1  Basic Mobility Total Score: 18    AM-PAC Raw Score CMS G-Code Modifier Level of  Impairment Assistance   6 % Total / Unable   7 - 9 CM 80 - 100% Maximal Assist   10 - 14 CL 60 - 80% Moderate Assist   15 - 19 CK 40 - 60% Moderate Assist   20 - 22 CJ 20 - 40% Minimal Assist   23 CI 1-20% SBA / CGA   24 CH 0% Independent/ Mod I     Patient left up in chair with call button in reach.    Assessment:  PT FLORESITA MIN TX    Rehab identified problem list/impairments: Rehab identified problem list/impairments: weakness, impaired endurance, gait instability, impaired balance, decreased lower extremity function, impaired self care skills, decreased ROM, pain, impaired functional mobilty, decreased safety awareness    Rehab potential is good.    Activity tolerance: Fair    Discharge recommendations: Discharge Facility/Level of Care Needs: home health PT     Barriers to discharge:      Equipment recommendations: Equipment Needed After Discharge: none     GOALS:   Multidisciplinary Problems     Physical Therapy Goals        Problem: Physical Therapy Goal    Goal Priority Disciplines Outcome Goal Variances Interventions   Physical Therapy Goal     PT, PT/OT Ongoing, Progressing     Description: LTG: 3/31/22  1. PT SUP>SIT EOB WITH SBA  2. PT WILL T/F TO CHAIR WITH RW AND CGA  3. PT WILL GT TRAIN X 80' WITH RW AND CGA                   PLAN:    Patient to be seen 3 x/week  to address the above listed problems via gait training, therapeutic activities, therapeutic exercises  Plan of Care expires: 03/31/22  Plan of Care reviewed with: patient, son        03/23/2022

## 2022-03-23 NOTE — PLAN OF CARE
[facility] Med Surg  Discharge Final Note    Primary Care Provider: Salvador Yanez NP    Expected Discharge Date: 3/23/2022    Final Discharge Note (most recent)     Final Note - 03/23/22 1501        Final Note    Assessment Type Final Discharge Note     Anticipated Discharge Disposition Hospice/Home     Hospital Resources/Appts/Education Provided Provided patient/caregiver with written discharge plan information;Appointments scheduled and added to AVS        Post-Acute Status    Discharge Delays None known at this time                 Important Message from Medicare  Important Message from Medicare regarding Discharge Appeal Rights: Given to patient/caregiver, Explained to patient/caregiver, Signed/date by patient/caregiver     Date IMM was signed: 03/21/22  Time IMM was signed: 0934    Contact Info     Michael Castro MD   Specialty: Colon and Rectal Surgery, General Surgery    [address] [place] [address] [address] [place] [id number]   Phone: [phone]       Next Steps: Follow up in 2 week(s)    Instructions: staple removal, postop check          Patient to dc home with [facility]. CM notified Isa of [facility] of patients DC. Cm faxed DC orders and AVS to Isa at [phone]. No other cm needs.

## 2022-03-24 NOTE — PT/OT/SLP PROGRESS
Occupational Therapy      Patient Name:  Jag Tipton   MRN:  [id number]    S: PT COOPERATIVE WITH THERAPY SESSION.  O: PT REQ SBA WITH SUPINE<SIT TRANSFERS AND FORWARD SEATED SCOOTING. PT REQ MIN A  WITH SIT <> STANDING AND MAX VERBAL CUES FOR SAFETY  AND HAND PLACEMENT. PT REQ CGA WITH FUNCTIONAL MOBILITY X 20 FEET WITH VC'S FOR SAFETY AND POSTURE.  A: PT CONTINUES TO DISPLAY POOR SAFETY DURING SIT <.STAND TRANSFERS  P: CONTINUE  WITH POC.  Yaritza Burton, OT  [phone]  3/23/2022

## 2022-03-29 ENCOUNTER — TELEPHONE (OUTPATIENT)
Dept: SURGERY | Facility: CLINIC | Age: 82
End: 2022-03-29
Payer: MEDICARE

## 2022-03-29 NOTE — TELEPHONE ENCOUNTER
"The following message was sent to Dr Castro after speaking to pt's daughter in law Belkys:  "Pt's daughter-in-law states he is having an out put of 500-600 mL/day since being discharged from hospital - Says she knows you wanted to make sure he didn't go over 1,200 mL/day but was unsure if there is a minimum amount she should notify you of. States she has cut the Lomotil back to 2/day from 4/day because his stool was becoming a little too "firm" - She wants to make sure this is ok - I've advised to make sure he is getting adequate daily water intake and cutting down the Lomotil is fine. - I was unsure about the Ostomy output - I think the range is 800 - 1200 mL/day for an Ileostomy but not certain. Please advise."      "

## 2022-03-30 ENCOUNTER — TELEPHONE (OUTPATIENT)
Dept: SURGERY | Facility: CLINIC | Age: 82
End: 2022-03-30
Payer: MEDICARE

## 2022-03-30 NOTE — TELEPHONE ENCOUNTER
"Spoke to pt's daughter in law - Relayed Dr Castro's instructions - Ana verbalized understanding and appreciation for Dr Castro and staff     ----- Message from Michael Castro MD sent at 3/29/2022  5:04 PM CDT -----  Regarding: RE: Ostomy Output    Yes want "oatmeal consistency" stool via ostomy. Anything under 1000cc is fine. Yes okay to back off lomotil    ----- Message -----  From: Nely Saldana LPN  Sent: 3/29/2022   1:43 PM CDT  To: Michael Castro MD  Subject: Ostomy Output                                    Pt's daughter-in-law states he is having an out put of 500-600 mL since being discharged from hospital - Says she knows you wanted to make sure he didn't go over 1,200 mL but was unsure if there is a minimum amount she should notify you of. States she has cut the Lomotil back to 2/day from 4/day because his stool was becoming a little too "firm" - She wants to make sure this is ok - I've advised to make sure he is getting adequate daily water intake and cutting down the Lomotil is fine. - I was unsure about the Ostomy output - I think the range is 800 - 1200 mL/day for an Ileostomy but not certain. Please advise.           "

## 2022-04-04 ENCOUNTER — OFFICE VISIT (OUTPATIENT)
Dept: SURGERY | Facility: CLINIC | Age: 82
End: 2022-04-04
Payer: MEDICARE

## 2022-04-04 VITALS
BODY MASS INDEX: 14.56 KG/M2 | WEIGHT: 126 LBS | DIASTOLIC BLOOD PRESSURE: 64 MMHG | SYSTOLIC BLOOD PRESSURE: 113 MMHG | TEMPERATURE: 97 F | HEART RATE: 95 BPM

## 2022-04-04 DIAGNOSIS — C18.2 MALIGNANT NEOPLASM OF ASCENDING COLON: Primary | ICD-10-CM

## 2022-04-04 PROCEDURE — 99024 PR POST-OP FOLLOW-UP VISIT: ICD-10-PCS | Mod: POP,,, | Performed by: COLON & RECTAL SURGERY

## 2022-04-04 PROCEDURE — 99024 POSTOP FOLLOW-UP VISIT: CPT | Mod: POP,,, | Performed by: COLON & RECTAL SURGERY

## 2022-04-04 PROCEDURE — 99213 OFFICE O/P EST LOW 20 MIN: CPT | Mod: PBBFAC | Performed by: COLON & RECTAL SURGERY

## 2022-04-04 PROCEDURE — 99999 PR PBB SHADOW E&M-EST. PATIENT-LVL III: CPT | Mod: PBBFAC,,, | Performed by: COLON & RECTAL SURGERY

## 2022-04-04 PROCEDURE — 99999 PR PBB SHADOW E&M-EST. PATIENT-LVL III: ICD-10-PCS | Mod: PBBFAC,,, | Performed by: COLON & RECTAL SURGERY

## 2022-04-04 NOTE — PROGRESS NOTES
History & Physical    SUBJECTIVE:     CC: Colon cancer  Ref: Castro Bales MD    History of Present Illness:  Patient is a 81 y.o. 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  Patient has developed abdominal pain and a 40 lb weight loss over the past 6 months prompting a workup.  CT scan of the abdomen and pelvis revealed a large cecal mass and evidence of metastatic disease.  Patient did undergo colonoscopy where a large mass was seen in the right colon confirming adenocarcinoma.  Patient has had associated decreased appetite.  Denies any hematochezia or melena.  Does suffer from intermittent fecal incontinence.  Has seen Hematology-Oncology and referred to Colorectal surgery for primary resection.  He denies any family history of colorectal cancer.  Only past surgical history is right inguinal hernia repair.    03/15/2022: Open right hemicolectomy with ileostomy creation    Interval History:  Since the last clinic visit, the patient underwent open right hemicolectomy with ileostomy creation on 03/15/2022. He has done well since discharge from the hospital. He was having high ileostomy output before discharge, but this was improved and has not been an issue since he has been home. He is having minimal incisional pain, but the deep abdominal pain that he was experiencing before surgery is now resolved. He is on hospice and is not currently undergoing chemotherapy. His appetite has varied, and he is drinking protein shake supplements to support his nutritional needs. Denies fevers, chills, nausea, and vomiting.     Review of patient's allergies indicates:  No Known Allergies    Current Outpatient Medications   Medication Sig Dispense Refill    fentaNYL (DURAGESIC) 100 mcg/hr Place 1 patch onto the skin every 72 hours.      furosemide (LASIX) 20 MG tablet Take 20 mg by mouth 2 (two) times daily.      ibuprofen (ADVIL,MOTRIN) 600 MG tablet Take 600 mg by mouth every 6 (six) hours as needed for Pain.       loperamide (IMODIUM) 2 mg capsule Take 2 capsules (4 mg total) by mouth 4 (four) times daily. 240 capsule 3    oxyCODONE-acetaminophen (PERCOCET)  mg per tablet Take 10 tablets by mouth as needed.      traZODone (DESYREL) 50 MG tablet Take 50 mg by mouth every evening.      metroNIDAZOLE (FLAGYL) 500 MG tablet Take 1 tablet (500 mg total) by mouth 3 (three) times daily. Take initial dose@2pm, second dose@3pm and final dose@10pm day before surgery 3 tablet 0    neomycin (MYCIFRADIN) 500 mg Tab Take 2 tablets (1,000 mg total) by mouth 3 (three) times daily. Take 1st dose@2pm,take 2nd dose@3pm, take last dose@10pm day before surgery 6 tablet 0    polyethylene glycol (GLYCOLAX) 17 gram/dose powder Take 238 g by mouth once daily. Mix with 2L of gatorade, drink all mixed solution starting@12pm day before surgery, finish by 10pm 1 each 0     No current facility-administered medications for this visit.       Past Medical History:   Diagnosis Date    Colon cancer 02/2022    cecal     Past Surgical History:   Procedure Laterality Date    BIOPSY OF ABDOMINAL WALL N/A 3/15/2022    Procedure: BIOPSY, ABDOMINAL WALL;  Surgeon: Michael Castro MD;  Location: [facility] [place] OR;  Service: Colon and Rectal;  Laterality: N/A;    BIOPSY OF PERITONEUM N/A 3/15/2022    Procedure: BIOPSY, PERITONEUM;  Surgeon: Michael Castro MD;  Location: [facility] [place] OR;  Service: Colon and Rectal;  Laterality: N/A;    COLECTOMY, RIGHT N/A 3/15/2022    Procedure: COLECTOMY, RIGHT OPEN WITH END ILEOSTOMY CONSTRUCTION;  Surgeon: Michael Castro MD;  Location: [facility] [place] OR;  Service: Colon and Rectal;  Laterality: N/A;    INJECTION OF ANESTHETIC AGENT INTO TISSUE PLANE DEFINED BY TRANSVERSUS ABDOMINIS MUSCLE N/A 3/15/2022    Procedure: BLOCK, TRANSVERSUS ABDOMINIS PLANE;  Surgeon: Michael Castro MD;  Location: [facility] [place] OR;  Service: Colon and Rectal;  Laterality: N/A;    LIVER BIOPSY N/A 3/15/2022    Procedure: BIOPSY, LIVER;  Surgeon: Michael HUERTA  MD Matthew;  Location: [facility] OR;  Service: Colon and Rectal;  Laterality: N/A;    RIGHT HEMICOLECTOMY Right 3/15/2022    Procedure: HEMICOLECTOMY, RIGHT;  Surgeon: Michael Castro MD;  Location: [facility] OR;  Service: Colon and Rectal;  Laterality: Right;     Family History   Problem Relation Age of Onset    Cancer Sister     Cancer Brother      Social History     Tobacco Use    Smoking status: Current Every Day Smoker     Packs/day: 1.00     Years: 65.00     Pack years: 65.00     Types: Cigarettes    Smokeless tobacco: Never Used   Substance Use Topics    Alcohol use: Not Currently    Drug use: Never        Review of Systems:  Review of Systems   Constitutional: Positive for activity change, appetite change and unexpected weight change. Negative for chills, fatigue and fever.   HENT: Negative for congestion, ear pain, sore throat and trouble swallowing.    Eyes: Negative for pain, redness and itching.   Respiratory: Negative for cough, shortness of breath and wheezing.    Cardiovascular: Negative for chest pain, palpitations and leg swelling.   Gastrointestinal: Positive for abdominal pain (Incisional, improved from pre-op). Negative for abdominal distention, constipation, diarrhea, nausea and vomiting.   Endocrine: Negative for cold intolerance, heat intolerance and polyuria.   Genitourinary: Negative for dysuria, flank pain, frequency and hematuria.   Musculoskeletal: Negative for gait problem, joint swelling and neck pain.   Skin: Negative for color change, rash and wound.   Allergic/Immunologic: Negative for environmental allergies and immunocompromised state.   Neurological: Negative for dizziness, speech difficulty, weakness and numbness.   Psychiatric/Behavioral: Negative for agitation, confusion and hallucinations.       OBJECTIVE:     Vital Signs (Most Recent)  Temp: 97.2 °F (36.2 °C) (04/04/22 1117)  Pulse: 95 (04/04/22 1117)  BP: 113/64 (04/04/22 1117)     57.2 kg (126 lb)     Physical  Exam:  Physical Exam  Vitals reviewed.   Constitutional:       General: He is not in acute distress.     Appearance: He is well-developed.      Comments: Cachectic   HENT:      Head: Normocephalic and atraumatic.   Eyes:      Conjunctiva/sclera: Conjunctivae normal.   Neck:      Thyroid: No thyromegaly.   Cardiovascular:      Rate and Rhythm: Normal rate and regular rhythm.   Pulmonary:      Effort: Pulmonary effort is normal. No respiratory distress.   Abdominal:      General: There is no distension.      Palpations: Abdomen is soft. There is no mass.      Tenderness: There is no abdominal tenderness.      Comments: Midline surgical site clean and dry without SOI. Staples removed today. Ileostomy healthy with stool in bag.   Musculoskeletal:         General: No tenderness. Normal range of motion.      Cervical back: Normal range of motion.   Skin:     General: Skin is warm and dry.      Capillary Refill: Capillary refill takes less than 2 seconds.      Findings: No rash.   Neurological:      Mental Status: He is alert and oriented to person, place, and time.   Psychiatric:         Behavior: Behavior normal.         Laboratory  Lab Results   Component Value Date    WBC 13.47 (H) 03/23/2022    HGB 10.1 (L) 03/23/2022    HCT 31.9 (L) 03/23/2022     (H) 03/23/2022    ALT 10 03/17/2022    AST 17 03/17/2022     (L) 03/23/2022    K 4.0 03/23/2022     03/23/2022    CREATININE 0.5 03/23/2022    BUN 13 03/23/2022    CO2 20 (L) 03/23/2022    TSH 2.216 02/22/2022     CEA: 6.8 (Feb '22)      Diagnostic Results:  CT: Reviewed    Colonoscopy images:      PATH: reviewed OSH reports    Final surgical pathology:  Final Pathologic Diagnosis 1. 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 SUBMITTED AS "DUODENAL MARGIN", EXCISION:   - Positive for medullary carcinoma   3. LIVER, BIOPSY:   - Metastatic medullary carcinoma   4. SUBMITTED AS "ABDOMINAL WALL", BIOPSY:   - Metastatic medullary carcinoma   5. SUBMITTED AS "PERITONEUM", BIOPSY:   - Metastatic medullary carcinoma    Comment: Interp By Emmanuel Garcia M.D., Signed on 03/28/2022 at 10:06   Comment Multiple immunohisitochemical (IHC) studies with appropriate controls were   performed on block 1F. The tumor cells are positive for pan-cytokeratin   AE1/3, CAM 5.2, and BerEP4. Additional studies, including CK7, CK20, CK5/6,   CDX2, GATA3, PSA, PSAP, synaptophysin, chromogranin, CD56, S100, and   calretinin, are negative in the cells of interest. The combined morphologic   features and immunoprofile, including abnormal mismatch repair protein   expression (see below) support the above diagnostic impression. Pertinent   slides were reviewed by Virginia Joe and Sharron Glez and they concur   with the diagnostic classification.   IHC studies for DNA mismatch repair proteins performed on this tumor   demonstrate LOSS OF MLH1 and PMS2 expression. Remaining proteins (MSH2 and   MSH6) exhibit normal/intact expression. MLH1 hypermethylation and BRAF   mutation studies will be performed at [facility] and the   results will be issued in a supplemental report.   COLON AND RECTUM: Resection, Including Transanal Disk Excision of Rectal   Neoplasms:      Procedure       Right hemicolectomy       Tumor Site       Colon, not otherwise specified (tumor mass is centered   in the cecum and extends into the proximal appendix, ileocecal valve, and   distal ileum)       Histologic Type:       Medullary carcinoma       Histologic Grade       Not applicable       Tumor Size       Greatest dimension (Centimeters) 10.4 cm     Tumor " Extent:    Directly invades or adheres to adjacent structure(s)   periduodneal soft tissue       Macroscopic Tumor Perforation       Present     Lymphovascular Invasion:        Present       Perineural Invasion       Present       Treatment Effect       Absent     Treatment Effect:        No known presurgical therapy           Proximal Margin:           Uninvolved by invasive carcinoma, high   grade dysplasia / intramucosal carcinoma, and low grade dysplasia:           Distal Margin:           Uninvolved by invasive carcinoma, high grade   dysplasia / intramucosal carcinoma, and low grade dysplasia:      Invasive carcinoma present at margin:     Margin(s) Involved by Invasive   Carcinoma:      periduodneal soft tissue (see part 2)     Margin Status for Non-Invasive Tumor:    All margins negative for   high-grade dysplasia / intramucosal carcinoma and low-grade dysplasia     LYMPH NODES       Regional Lymph Node Status:      All regional lymph nodes negative for   tumor           Number of Lymph Nodes Involved           0           Number of Lymph Nodes Examined           12       Tumor Deposits       Not identified    DISTANT METASTASIS:    Distant Site(s) Involved:    Liver (see part 3)      Abdominal wall (see part 4) and peritoneum (see part 5)     pT Category:        pT4b     pN Category:        pN0       Distant Metastasis (pM)       pM1c          ASSESSMENT/PLAN:     82yo M with R colon adenocarcinoma with severe weight loss and fecal incontinence now s/p open right hemicolectomy with ileostomy creation on 03/15/2022 with final pathology showing medullary adenocarcinoma with positive margins and liver mets.    - Pathology reviewed.  Again discussed with the patient that gross residual carcinoma was left inside the abdominal cavity both in his perforation cavity as well as on the duodenum.  Discussed that for complete surgical excision care, this would require a much larger operation I do not think he would be  able to tolerate which would likely involve a Whipple procedure.  He voiced understanding of this and is in agreement that he would not be able tolerate this.  - Advised that any treatment going forward would include chemotherapy and/or radiation, which the patient is not interested in at this time.  The patient is also not a good candidate for any further surgery to address metastatic disease. The patient and the family voiced understanding and will continue current hospice care.  - RTC p.r.n.    Michael Castro MD  Colon and Rectal Surgery  [facility]

## 2022-04-05 LAB
COMMENT: NORMAL
FINAL PATHOLOGIC DIAGNOSIS: NORMAL
GROSS: NORMAL
Lab: NORMAL
SUPPLEMENTAL DIAGNOSIS: NORMAL

## 2022-04-05 NOTE — PHYSICIAN QUERY
PT Name: Jag Tipton  MR #: [id number]    DOCUMENTATION CLARIFICATION     CDS: Brandy Capley, RN  Email: [email or web address]    This form is a permanent document in the medical record.     Query Date: April 5, 2022    By submitting this query, we are merely seeking further clarification of documentation.  Please utilize your independent clinical judgment when addressing the question(s) below.    The medical record contains the following:  Pathology Findings Location in Medical Record     TERMINAL ILEUM AND RIGHT COLON, RIGHT HEMICOLECTOMY:  - Medullary carcinoma (10.4 cm in greatest dimension)  - Carcinoma is contiguous with transmural perforation and involves periduodenal soft tissue (see part 2 for additional details)  - Loss of MLH1 and PMS2 by immunohistochemistry  - Twelve lymph nodes, negative for metastatic disease (0/12)  - See comments for synoptic report    SUBMITTED AS DUODENAL MARGIN, EXCISION:  - Positive for medullary carcinoma    SUBMITTED AS ABDOMINAL WALL, BIOPSY:  - Metastatic medullary carcinoma    SUBMITTED AS PERITONEUM, BIOPSY:  - Metastatic medullary carcinoma    COLON AND RECTUM: Resection, Including Transanal Disk Excision of Rectal Neoplasms:   Procedure Right hemicolectomy  Tumor Site Colon, not otherwise specified (tumor mass is centered in the cecum and extends into the proximal appendix, ileocecal valve, and distal ileum)  Histologic Type: Medullary carcinoma  Histologic Grade Not applicable  Tumor Size Greatest dimension (Centimeters) 10.4 cm  Tumor Extent: Directly invades or adheres to adjacent structure(s) periduodneal soft tissue     Surgical pathology 3/15       Please clarify:    [  x] Pathology findings noted above are ruled in/confirmed as diagnoses   [  ] Pathology findings noted above are not confirmed as diagnoses   [  ] Other diagnosis (please specify): ___________   [  ] Clinically Undetermined       Please document in your progress notes daily for the duration of  treatment until resolved and include in your discharge summary.    Form No. [id number]
